# Patient Record
Sex: FEMALE | Race: WHITE | NOT HISPANIC OR LATINO | Employment: OTHER | URBAN - METROPOLITAN AREA
[De-identification: names, ages, dates, MRNs, and addresses within clinical notes are randomized per-mention and may not be internally consistent; named-entity substitution may affect disease eponyms.]

---

## 2017-10-05 ENCOUNTER — TRANSFERRED RECORDS (OUTPATIENT)
Dept: HEALTH INFORMATION MANAGEMENT | Facility: CLINIC | Age: 62
End: 2017-10-05

## 2017-10-24 ENCOUNTER — OFFICE VISIT (OUTPATIENT)
Dept: FAMILY MEDICINE | Facility: CLINIC | Age: 62
End: 2017-10-24
Payer: COMMERCIAL

## 2017-10-24 VITALS
TEMPERATURE: 97.9 F | WEIGHT: 148.4 LBS | HEART RATE: 52 BPM | DIASTOLIC BLOOD PRESSURE: 68 MMHG | HEIGHT: 66 IN | SYSTOLIC BLOOD PRESSURE: 114 MMHG | OXYGEN SATURATION: 96 % | BODY MASS INDEX: 23.85 KG/M2

## 2017-10-24 DIAGNOSIS — M94.9 DISORDER OF BONE AND CARTILAGE: ICD-10-CM

## 2017-10-24 DIAGNOSIS — Z00.00 ENCOUNTER FOR WELLNESS EXAMINATION IN ADULT: Primary | ICD-10-CM

## 2017-10-24 DIAGNOSIS — E78.5 HYPERLIPIDEMIA LDL GOAL <160: ICD-10-CM

## 2017-10-24 DIAGNOSIS — Z12.39 BREAST CANCER SCREENING: ICD-10-CM

## 2017-10-24 DIAGNOSIS — Z11.59 NEED FOR HEPATITIS C SCREENING TEST: ICD-10-CM

## 2017-10-24 DIAGNOSIS — Z23 NEED FOR VACCINATION: ICD-10-CM

## 2017-10-24 DIAGNOSIS — Z13.1 SCREENING FOR DIABETES MELLITUS: ICD-10-CM

## 2017-10-24 DIAGNOSIS — Z13.21 ENCOUNTER FOR VITAMIN DEFICIENCY SCREENING: ICD-10-CM

## 2017-10-24 DIAGNOSIS — M89.9 DISORDER OF BONE AND CARTILAGE: ICD-10-CM

## 2017-10-24 LAB
CHOLEST SERPL-MCNC: 237 MG/DL
DEPRECATED CALCIDIOL+CALCIFEROL SERPL-MC: 51 UG/L (ref 20–75)
GLUCOSE SERPL-MCNC: 92 MG/DL (ref 70–99)
HCV AB SERPL QL IA: NONREACTIVE
HDLC SERPL-MCNC: 88 MG/DL
LDLC SERPL CALC-MCNC: 131 MG/DL
NONHDLC SERPL-MCNC: 149 MG/DL
TRIGL SERPL-MCNC: 89 MG/DL

## 2017-10-24 PROCEDURE — 82306 VITAMIN D 25 HYDROXY: CPT | Performed by: FAMILY MEDICINE

## 2017-10-24 PROCEDURE — 99396 PREV VISIT EST AGE 40-64: CPT | Mod: 25 | Performed by: FAMILY MEDICINE

## 2017-10-24 PROCEDURE — 82947 ASSAY GLUCOSE BLOOD QUANT: CPT | Performed by: FAMILY MEDICINE

## 2017-10-24 PROCEDURE — 36415 COLL VENOUS BLD VENIPUNCTURE: CPT | Performed by: FAMILY MEDICINE

## 2017-10-24 PROCEDURE — 90686 IIV4 VACC NO PRSV 0.5 ML IM: CPT | Performed by: FAMILY MEDICINE

## 2017-10-24 PROCEDURE — 80061 LIPID PANEL: CPT | Performed by: FAMILY MEDICINE

## 2017-10-24 PROCEDURE — 86803 HEPATITIS C AB TEST: CPT | Performed by: FAMILY MEDICINE

## 2017-10-24 PROCEDURE — 90471 IMMUNIZATION ADMIN: CPT | Performed by: FAMILY MEDICINE

## 2017-10-24 NOTE — PROGRESS NOTES
SUBJECTIVE:   CC: Marbella Welsh is an 62 year old woman who presents for preventive health visit.   According to patient  She is active, jog on track , and lift weights at the gym, ride sationary bike- at gym, also does scott & yoga and walks the dog  She is menopausal for years, first child at age 35- used estrogen for 1 yr- none since many years- no hot flashes  Last DEXA- many years ago- it was borderline  She does take vitamin D 1000 mg 5 times a week    Healthy Habits:    Do you get at least three servings of calcium containing foods daily (dairy, green leafy vegetables, etc.)? yes    Amount of exercise or daily activities, outside of work: 5 day(s) per week    Problems taking medications regularly not applicable    Medication side effects: No    Have you had an eye exam in the past two years? yes    Do you see a dentist twice per year? yes    Do you have sleep apnea, excessive snoring or daytime drowsiness?no          PROBLEMS TO ADD ON...    Today's PHQ-2 Score:   PHQ-2 ( 1999 Pfizer) 10/24/2017 10/7/2013   Q1: Little interest or pleasure in doing things 0 0   Q2: Feeling down, depressed or hopeless 0 0   PHQ-2 Score 0 0         Abuse: Current or Past(Physical, Sexual or Emotional)- No  Do you feel safe in your environment - Yes  Social History   Substance Use Topics     Smoking status: Former Smoker     Types: Cigarettes     Quit date: 1/23/1978     Smokeless tobacco: Never Used     Alcohol use Yes      Comment: 2 beers weekly     The patient does not drink >3 drinks per day nor >7 drinks per week.    Reviewed orders with patient.  Reviewed health maintenance and updated orders accordingly - Yes  Labs reviewed in [facility]    Patient over age 50, mutual decision to screen reflected in health maintenance.      Pertinent mammograms are reviewed under the imaging tab.  History of abnormal Pap smear: Status post benign hysterectomy. Health Maintenance and Surgical History updated.    Reviewed and updated as needed  "this visit by clinical staff  Tobacco  Allergies  Meds         Reviewed and updated as needed this visit by Provider              ROS:  C: NEGATIVE for fever, chills, change in weight  I: NEGATIVE for worrisome rashes, moles or lesions  E: NEGATIVE for vision changes or irritation  ENT: NEGATIVE for ear, mouth and throat problems  R: NEGATIVE for significant cough or SOB  B: NEGATIVE for masses, tenderness or discharge  CV: NEGATIVE for chest pain, palpitations or peripheral edema  GI: NEGATIVE for nausea, abdominal pain, heartburn, or change in bowel habits  : NEGATIVE for unusual urinary or vaginal symptoms. Periods are regular.  M: NEGATIVE for significant arthralgias or myalgia  N: NEGATIVE for weakness, dizziness or paresthesias  P: NEGATIVE for changes in mood or affect    OBJECTIVE:   /68  Pulse 52  Temp 97.9  F (36.6  C) (Oral)  Ht 5' 6.25\" (1.683 m)  Wt 148 lb 6.4 oz (67.3 kg)  SpO2 96%  BMI 23.77 kg/m2  EXAM:  GENERAL: healthy, alert and no distress  EYES: Eyes grossly normal to inspection, PERRL and conjunctivae and sclerae normal  HENT: ear canals and TM's normal, nose and mouth without ulcers or lesions  NECK: no adenopathy, no asymmetry, masses, or scars and thyroid normal to palpation  RESP: lungs clear to auscultation - no rales, rhonchi or wheezes  BREAST: normal without masses, tenderness or nipple discharge and no palpable axillary masses or adenopathy  CV: regular rate and rhythm, normal S1 S2, no S3 or S4, no murmur, click or rub, no peripheral edema and peripheral pulses strong  ABDOMEN: soft, nontender, no hepatosplenomegaly, no masses and bowel sounds normal  MS: no gross musculoskeletal defects noted, no edema  SKIN: no suspicious lesions or rashes  NEURO: Normal strength and tone, mentation intact and speech normal  PSYCH: mentation appears normal, affect normal/bright    ASSESSMENT/PLAN:   (Z00.00) Encounter for wellness examination in adult  (primary encounter " "diagnosis)  Plan: Please see patient instructions     (E78.5) Hyperlipidemia LDL goal <160  Comment: fasting -   Plan: Lipid panel reflex to direct LDL Fasting            (M89.9,  M94.9) Disorder of bone and cartilage  Comment: Plan: DX Hip/Pelvis/Spine  Continue with wt bearing excercise  Discussed bisphosphonate to be started weekly if osteopenia/ OP- and high risk of fractures  Will send her the letter  Potential medication side effects were discussed with the patient; let me know if any occur.            (Z12.31) Breast cancer screening  Comment: Plan: completed Oct 2017- continue annually     (Z13.1) Screening for diabetes mellitus  Comment: fasting  Plan: GLUCOSE            (Z11.59) Need for hepatitis C screening test  Comment: Plan: Hepatitis C Screen Reflex to HCV RNA Quant and         Genotype            (Z23) Need for vaccination  Comment: Plan: HC FLU VAC PRESRV FREE QUAD SPLIT VIR 3+YRS IM            (Z13.21) Encounter for vitamin deficiency screening  Comment: 5 days a week- supplemental Vitamin D 1000 mg  Plan: Vitamin D Deficiency              COUNSELING:   Reviewed preventive health counseling, as reflected in patient instructions       Regular exercise       Healthy diet/nutrition         reports that she quit smoking about 39 years ago. Her smoking use included Cigarettes. She has never used smokeless tobacco.    Estimated body mass index is 23.77 kg/(m^2) as calculated from the following:    Height as of this encounter: 5' 6.25\" (1.683 m).    Weight as of this encounter: 148 lb 6.4 oz (67.3 kg).         Counseling Resources:  ATP IV Guidelines  Pooled Cohorts Equation Calculator  Breast Cancer Risk Calculator  FRAX Risk Assessment  ICSI Preventive Guidelines  Dietary Guidelines for Americans, 2010  USDA's MyPlate  ASA Prophylaxis  Lung CA Screening    Nancy Marie MD  [facility]  "

## 2017-10-24 NOTE — LETTER
October 25, 2017    Marbella Welsh  [address]  [facility] [address]      Dear Marlene Tyson seeing you in the clinic today     - total Cholesterol sightly high- rest of the  levels (LDL,HDL, Triglycerides) are normal.  ADVISE: rechecking in 1 year.   -Glucose (diabetic screening test) is normal.   -Hepatitis C antibody screen test shows no signs of a previous hepatitis C infection.   -Vitamin D level is normal, 1000 IU daily in diet or supplements is recommended.     Please keep us posted with questions or concerns .     Thank you very much for choosing [facility]. Please call my office if you have any questions or concerns.      Sincerely,        Nancy Marie MD  Resulted Orders   GLUCOSE   Result Value Ref Range    Glucose 92 70 - 99 mg/dL   Lipid panel reflex to direct LDL Fasting   Result Value Ref Range    Cholesterol 237 (H) <200 mg/dL      Comment:      Desirable:       <200 mg/dl    Triglycerides 89 <150 mg/dL    HDL Cholesterol 88 >49 mg/dL    LDL Cholesterol Calculated 131 (H) <100 mg/dL      Comment:      Above desirable:  100-129 mg/dl  Borderline High:  130-159 mg/dL  High:             160-189 mg/dL  Very high:       >189 mg/dl      Non HDL Cholesterol 149 (H) <130 mg/dL      Comment:      Above Desirable:  130-159 mg/dl  Borderline high:  160-189 mg/dl  High:             190-219 mg/dl  Very high:       >219 mg/dl     Hepatitis C Screen Reflex to HCV RNA Quant and Genotype   Result Value Ref Range    Hepatitis C Antibody Nonreactive NR^Nonreactive      Comment:      Assay performance characteristics have not been established for newborns,   infants, and children     Vitamin D Deficiency   Result Value Ref Range    Vitamin D Deficiency screening 51 20 - 75 ug/L      Comment:      Season, race, dietary intake, and treatment affect the concentration of   25-hydroxy-Vitamin D. Values may decrease during winter months and increase   during summer months. Values 20-29 ug/L  may indicate Vitamin D insufficiency   and values <20 ug/L may indicate Vitamin D deficiency.  Vitamin D determination is routinely performed by an immunoassay specific for   25 hydroxyvitamin D3.  If an individual is on vitamin D2 (ergocalciferol)   supplementation, please specify 25 OH vitamin D2 and D3 level determination by   LCMSMS test [id number].

## 2017-10-24 NOTE — PATIENT INSTRUCTIONS
please call [phone] to make appointment   Preventive Health Recommendations  Female Ages 50 - 64    Yearly exam: See your health care provider every year in order to  o Review health changes.   o Discuss preventive care.    o Review your medicines if your doctor has prescribed any.      Get a Pap test every three years (unless you have an abnormal result and your provider advises testing more often).    If you get Pap tests with HPV test, you only need to test every 5 years, unless you have an abnormal result.     You do not need a Pap test if your uterus was removed (hysterectomy) and you have not had cancer.    You should be tested each year for STDs (sexually transmitted diseases) if you're at risk.     Have a mammogram every 1 to 2 years.    Have a colonoscopy at age 50, or have a yearly FIT test (stool test). These exams screen for colon cancer.      Have a cholesterol test every 5 years, or more often if advised.    Have a diabetes test (fasting glucose) every three years. If you are at risk for diabetes, you should have this test more often.     If you are at risk for osteoporosis (brittle bone disease), think about having a bone density scan (DEXA).    Shots: Get a flu shot each year. Get a tetanus shot every 10 years.    Nutrition:     Eat at least 5 servings of fruits and vegetables each day.    Eat whole-grain bread, whole-wheat pasta and brown rice instead of white grains and rice.    Talk to your provider about Calcium and Vitamin D.     Lifestyle    Exercise at least 150 minutes a week (30 minutes a day, 5 days a week). This will help you control your weight and prevent disease.    Limit alcohol to one drink per day.    No smoking.     Wear sunscreen to prevent skin cancer.     See your dentist every six months for an exam and cleaning.    See your eye doctor every 1 to 2 years.

## 2017-10-24 NOTE — NURSING NOTE
"Chief Complaint   Patient presents with     Physical       Initial /68  Pulse 52  Temp 97.9  F (36.6  C) (Oral)  Ht 5' 6.25\" (1.683 m)  Wt 148 lb 6.4 oz (67.3 kg)  SpO2 96%  BMI 23.77 kg/m2 Estimated body mass index is 23.77 kg/(m^2) as calculated from the following:    Height as of this encounter: 5' 6.25\" (1.683 m).    Weight as of this encounter: 148 lb 6.4 oz (67.3 kg).  Medication Reconciliation: complete      Health Maintenance that is potentially due pending provider review:  NONE    n/a    ROSAURA Espinosa  "

## 2017-10-24 NOTE — MR AVS SNAPSHOT
After Visit Summary   10/24/2017    Marbella Welsh    MRN: [id number]           Patient Information     Date Of Birth          1955        Visit Information        Provider Department      10/24/2017 8:30 AM Nancy Marie MD [facility]        Today's Diagnoses     Encounter for wellness examination in adult    -  1    Hyperlipidemia LDL goal <160        Disorder of bone and cartilage        Breast cancer screening        Screening for diabetes mellitus        Need for hepatitis C screening test        Need for vaccination        Encounter for vitamin deficiency screening          Care Instructions    please call [phone] to make appointment   Preventive Health Recommendations  Female Ages 50 - 64    Yearly exam: See your health care provider every year in order to  o Review health changes.   o Discuss preventive care.    o Review your medicines if your doctor has prescribed any.      Get a Pap test every three years (unless you have an abnormal result and your provider advises testing more often).    If you get Pap tests with HPV test, you only need to test every 5 years, unless you have an abnormal result.     You do not need a Pap test if your uterus was removed (hysterectomy) and you have not had cancer.    You should be tested each year for STDs (sexually transmitted diseases) if you're at risk.     Have a mammogram every 1 to 2 years.    Have a colonoscopy at age 50, or have a yearly FIT test (stool test). These exams screen for colon cancer.      Have a cholesterol test every 5 years, or more often if advised.    Have a diabetes test (fasting glucose) every three years. If you are at risk for diabetes, you should have this test more often.     If you are at risk for osteoporosis (brittle bone disease), think about having a bone density scan (DEXA).    Shots: Get a flu shot each year. Get a tetanus shot every 10 years.    Nutrition:     Eat at least 5 servings of fruits and  "vegetables each day.    Eat whole-grain bread, whole-wheat pasta and brown rice instead of white grains and rice.    Talk to your provider about Calcium and Vitamin D.     Lifestyle    Exercise at least 150 minutes a week (30 minutes a day, 5 days a week). This will help you control your weight and prevent disease.    Limit alcohol to one drink per day.    No smoking.     Wear sunscreen to prevent skin cancer.     See your dentist every six months for an exam and cleaning.    See your eye doctor every 1 to 2 years.            Follow-ups after your visit        Future tests that were ordered for you today     Open Future Orders        Priority Expected Expires Ordered    DX Hip/Pelvis/Spine Routine  10/24/2018 10/24/2017            Who to contact     If you have questions or need follow up information about today's clinic visit or your schedule please contact [facility] directly at [phone].  Normal or non-critical lab and imaging results will be communicated to you by MyChart, letter or phone within 4 business days after the clinic has received the results. If you do not hear from us within 7 days, please contact the clinic through [employer] or phone. If you have a critical or abnormal lab result, we will notify you by phone as soon as possible.  Submit refill requests through [employer]oBox or call your pharmacy and they will forward the refill request to us. Please allow 3 business days for your refill to be completed.          Additional Information About Your Visit        [employer] Information     PrestoBox lets you send messages to your doctor, view your test results, renew your prescriptions, schedule appointments and more. To sign up, go to [email or web address] . Click on \"Log in\" on the left side of the screen, which will take you to the Welcome page. Then click on \"Sign up Now\" on the right side of the page.     You will be asked to enter the access code listed below, as well as some personal " "information. Please follow the directions to create your username and password.     Your access code is: [email or web address]  Expires: 2018  9:03 AM     Your access code will  in 90 days. If you need help or a new code, please call your [place] clinic or [phone].        Care EveryWhere ID     This is your Care EveryWhere ID. This could be used by other organizations to access your [place] medical records  [id number]        Your Vitals Were     Pulse Temperature Height Pulse Oximetry BMI (Body Mass Index)       52 97.9  F (36.6  C) (Oral) 5' 6.25\" (1.683 m) 96% 23.77 kg/m2        Blood Pressure from Last 3 Encounters:   10/24/17 114/68   08/04/15 114/65   10/07/13 112/60    Weight from Last 3 Encounters:   10/24/17 148 lb 6.4 oz (67.3 kg)   08/04/15 147 lb 3.2 oz (66.8 kg)   10/07/13 148 lb (67.1 kg)              We Performed the Following     GLUCOSE     HC FLU VAC PRESRV FREE QUAD SPLIT VIR 3+YRS IM     Hepatitis C Screen Reflex to HCV RNA Quant and Genotype     Lipid panel reflex to direct LDL Fasting     Vitamin D Deficiency        Primary Care Provider Office Phone # Fax #    Nancy Marie [phone] [address] [facility] [address]        Equal Access to Services     RON DOAN AH: Hadii tien gregg hadasho Sosaraali, waaxda luqadaha, qaybta kaalmada adeegyada, josesito spence. So [facility] [phone].    ATENCIÓN: Si habla español, tiene a kline disposición servicios gratuitos de asistencia lingüística. Llame al [phone].    We comply with applicable federal civil rights laws and [place] laws. We do not discriminate on the basis of race, color, national origin, age, disability, sex, sexual orientation, or gender identity.            Thank you!     Thank you for choosing [facility]  for your care. Our goal is always to provide you with excellent care. Hearing back from our patients is one way we can continue to improve our " services. Please take a few minutes to complete the written survey that you may receive in the mail after your visit with us. Thank you!             Your Updated Medication List - Protect others around you: Learn how to safely use, store and throw away your medicines at www.disposemymeds.org.          This list is accurate as of: 10/24/17  9:03 AM.  Always use your most recent med list.                   Brand Name Dispense Instructions for use Diagnosis    fish oil-omega-3 fatty acids 1000 MG capsule      1 TABLET DAILY        MULTIVITAL Tabs      1 TABLET DAILY

## 2017-10-24 NOTE — PROGRESS NOTES
Send lab & letter      Pleasure seeing you in the clinic today    - total Cholesterol sightly high- rest of the  levels (LDL,HDL, Triglycerides) are normal.  ADVISE: rechecking in 1 year.  -Glucose (diabetic screening test) is normal.  -Hepatitis C antibody screen test shows no signs of a previous hepatitis C infection.  -Vitamin D level is normal, 1000 IU daily in diet or supplements is recommended.     Please keep us posted with questions or concerns .      Best Regards,    Nancy Marie MD  [facility]  [phone]

## 2018-05-10 ENCOUNTER — TELEPHONE (OUTPATIENT)
Dept: FAMILY MEDICINE | Facility: CLINIC | Age: 63
End: 2018-05-10

## 2018-05-10 NOTE — TELEPHONE ENCOUNTER
Reason for Call: Request for an order or referral:    Order or referral being requested: Strep    Date needed: as soon as possible    Has the patient been seen by the PCP for this problem? 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  YES    Call taken on 5/10/2018 at 5:01 PM by Liv Scott

## 2018-05-11 ENCOUNTER — OFFICE VISIT (OUTPATIENT)
Dept: FAMILY MEDICINE | Facility: CLINIC | Age: 63
End: 2018-05-11
Payer: COMMERCIAL

## 2018-05-11 VITALS
BODY MASS INDEX: 24.01 KG/M2 | DIASTOLIC BLOOD PRESSURE: 63 MMHG | OXYGEN SATURATION: 96 % | HEIGHT: 66 IN | HEART RATE: 59 BPM | WEIGHT: 149.4 LBS | TEMPERATURE: 98 F | SYSTOLIC BLOOD PRESSURE: 116 MMHG

## 2018-05-11 DIAGNOSIS — J02.0 STREP THROAT: Primary | ICD-10-CM

## 2018-05-11 DIAGNOSIS — R07.0 THROAT PAIN: ICD-10-CM

## 2018-05-11 LAB
DEPRECATED S PYO AG THROAT QL EIA: ABNORMAL
SPECIMEN SOURCE: ABNORMAL

## 2018-05-11 PROCEDURE — 87880 STREP A ASSAY W/OPTIC: CPT | Performed by: PHYSICIAN ASSISTANT

## 2018-05-11 PROCEDURE — 99213 OFFICE O/P EST LOW 20 MIN: CPT | Performed by: PHYSICIAN ASSISTANT

## 2018-05-11 RX ORDER — AMOXICILLIN 875 MG
875 TABLET ORAL 2 TIMES DAILY
Qty: 20 TABLET | Refills: 0 | Status: SHIPPED | OUTPATIENT
Start: 2018-05-11 | End: 2018-06-19

## 2018-05-11 NOTE — MR AVS SNAPSHOT
"              After Visit Summary   5/11/2018    Marbella Welsh    MRN: [id number]           Patient Information     Date Of Birth          1955        Visit Information        Provider Department      5/11/2018 3:00 PM Parker Jimenez PA-C [facility]        Today's Diagnoses     Strep throat    -  1    Throat pain           Follow-ups after your visit        Follow-up notes from your care team     Return if symptoms worsen or fail to improve.      Who to contact     If you have questions or need follow up information about today's clinic visit or your schedule please contact [facility] directly at [phone].  Normal or non-critical lab and imaging results will be communicated to you by MyChart, letter or phone within 4 business days after the clinic has received the results. If you do not hear from us within 7 days, please contact the clinic through [employer] or phone. If you have a critical or abnormal lab result, we will notify you by phone as soon as possible.  Submit refill requests through bitHound or call your pharmacy and they will forward the refill request to us. Please allow 3 business days for your refill to be completed.          Additional Information About Your Visit        MyChart Information     bitHound gives you secure access to your electronic health record. If you see a primary care provider, you can also send messages to your care team and make appointments. If you have questions, please call your primary care clinic.  If you do not have a primary care provider, please call [phone] and they will assist you.        Care EveryWhere ID     This is your Care EveryWhere ID. This could be used by other organizations to access your [place] medical records  [id number]        Your Vitals Were     Pulse Temperature Height Pulse Oximetry BMI (Body Mass Index)       59 98  F (36.7  C) (Tympanic) 5' 6.25\" (1.683 m) 96% 23.93 kg/m2        Blood Pressure from Last 3 " Encounters:   05/11/18 116/63   10/24/17 114/68   08/04/15 114/65    Weight from Last 3 Encounters:   05/11/18 149 lb 6.4 oz (67.8 kg)   10/24/17 148 lb 6.4 oz (67.3 kg)   08/04/15 147 lb 3.2 oz (66.8 kg)              We Performed the Following     Rapid strep screen          Today's Medication Changes          These changes are accurate as of 5/11/18  3:39 PM.  If you have any questions, ask your nurse or doctor.               Start taking these medicines.        Dose/Directions    amoxicillin 875 MG tablet   Commonly known as:  AMOXIL   Used for:  Strep throat        Dose:  875 mg   Take 1 tablet (875 mg) by mouth 2 times daily   Quantity:  20 tablet   Refills:  0            Where to get your medicines      These medications were sent to [place] [address] [place] [address] [address]     Phone:  [phone]     amoxicillin 875 MG tablet                Primary Care Provider Office Phone # Fax #    Nancy Geri Marie [phone] [address] [facility] [address]        Equal Access to Services     IRVING DOAN AH: Kathleen bouchero Sotito, waaxda luqadaha, qaybta kaalmada adecindada, josesito spence. So [facility] [phone].    ATENCIÓN: Si habla español, tiene a kline disposición servicios gratuitos de asistencia lingüística. Melanie al [phone].    We comply with applicable federal civil rights laws and [place] laws. We do not discriminate on the basis of race, color, national origin, age, disability, sex, sexual orientation, or gender identity.            Thank you!     Thank you for choosing [facility]  for your care. Our goal is always to provide you with excellent care. Hearing back from our patients is one way we can continue to improve our services. Please take a few minutes to complete the written survey that you may receive in the mail after your visit with us. Thank you!              Your Updated Medication List - Protect others around you: Learn how to safely use, store and throw away your medicines at www.disposemymeds.org.          This list is accurate as of 5/11/18  3:39 PM.  Always use your most recent med list.                   Brand Name Dispense Instructions for use Diagnosis    amoxicillin 875 MG tablet    AMOXIL    20 tablet    Take 1 tablet (875 mg) by mouth 2 times daily    Strep throat       fish oil-omega-3 fatty acids 1000 MG capsule      1 TABLET DAILY        MULTIVITAL Tabs      1 TABLET DAILY

## 2018-05-11 NOTE — PROGRESS NOTES
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  She does however have a stomachache, headache, and just all over not feeling well.  She would like to have a strep culture done today to rule that out.             Problem list and histories reviewed & adjusted, as indicated.  Additional history: as documented    BP Readings from Last 3 Encounters:   05/11/18 116/63   10/24/17 114/68   08/04/15 114/65    Wt Readings from Last 3 Encounters:   05/11/18 149 lb 6.4 oz (67.8 kg)   10/24/17 148 lb 6.4 oz (67.3 kg)   08/04/15 147 lb 3.2 oz (66.8 kg)                    Reviewed and updated as needed this visit by clinical staff  Meds       Reviewed and updated as needed this visit by Provider         ROS:  Constitutional, HEENT, cardiovascular, pulmonary, gi and gu systems are negative, except as otherwise noted.    OBJECTIVE:     /63  Pulse 59  Temp 98  F (36.7  C) (Tympanic)  Ht 5' 6.25\" (1.683 m)  Wt 149 lb 6.4 oz (67.8 kg)  SpO2 96%  BMI 23.93 kg/m2  Body mass index is 23.93 kg/(m^2).  GENERAL: alert and no distress  EYES: Eyes grossly normal to inspection  HENT: normal cephalic/atraumatic, ear canals and TM's normal, nose and mouth without ulcers or lesions, oropharynx clear and oral mucous membranes moist  NECK: no adenopathy, no asymmetry, masses, or scars and thyroid normal to palpation  RESP: lungs clear to auscultation - no rales, rhonchi or wheezes  CV: regular rate and rhythm, normal S1 S2, no S3 or S4, no murmur, click or rub, no peripheral edema and peripheral pulses strong  SKIN: no suspicious lesions or rashes    Diagnostic Test Results:  Results for orders placed or performed in visit on 05/11/18 (from the past 24 hour(s)) "   Rapid strep screen   Result Value Ref Range    Specimen Description Throat     Rapid Strep A Screen (A)      POSITIVE: Group A Streptococcal antigen detected by immunoassay.       ASSESSMENT/PLAN:             1. Strep throat  OTC probiotics while on antibiotic to help limit some potential side effects.   - amoxicillin (AMOXIL) 875 MG tablet; Take 1 tablet (875 mg) by mouth 2 times daily  Dispense: 20 tablet; Refill: 0    2. Throat pain    - Rapid strep screen    Follow up if symptoms persist or worsen     Parker Jimenez PA-C  [facility] [place] [facility]

## 2018-06-12 ENCOUNTER — HOSPITAL ENCOUNTER (OUTPATIENT)
Dept: BONE DENSITY | Facility: CLINIC | Age: 63
Discharge: HOME | End: 2018-06-12
Attending: FAMILY MEDICINE | Admitting: FAMILY MEDICINE
Payer: COMMERCIAL

## 2018-06-12 DIAGNOSIS — M89.9 DISORDER OF BONE AND CARTILAGE: ICD-10-CM

## 2018-06-12 DIAGNOSIS — M94.9 DISORDER OF BONE AND CARTILAGE: ICD-10-CM

## 2018-06-12 PROCEDURE — 77080 DXA BONE DENSITY AXIAL: CPT

## 2018-06-13 ENCOUNTER — MYC MEDICAL ADVICE (OUTPATIENT)
Dept: FAMILY MEDICINE | Facility: CLINIC | Age: 63
End: 2018-06-13

## 2018-06-13 NOTE — PROGRESS NOTES
Hi    You do have osteoporosis & starting medications to prevent further bone loss is a good option- besides weight bearing excercise .    I would like to discuss the medications in detail to go over the side effets    Please let me know- if you willing to start medications & then we can arrange for telephone only visit    thanks

## 2018-06-19 ENCOUNTER — OFFICE VISIT (OUTPATIENT)
Dept: FAMILY MEDICINE | Facility: CLINIC | Age: 63
End: 2018-06-19
Payer: COMMERCIAL

## 2018-06-19 VITALS
WEIGHT: 148 LBS | SYSTOLIC BLOOD PRESSURE: 117 MMHG | BODY MASS INDEX: 23.78 KG/M2 | TEMPERATURE: 98.1 F | DIASTOLIC BLOOD PRESSURE: 58 MMHG | RESPIRATION RATE: 16 BRPM | HEART RATE: 58 BPM | HEIGHT: 66 IN | OXYGEN SATURATION: 98 %

## 2018-06-19 DIAGNOSIS — M81.0 AGE-RELATED OSTEOPOROSIS WITHOUT CURRENT PATHOLOGICAL FRACTURE: Primary | ICD-10-CM

## 2018-06-19 DIAGNOSIS — Z23 NEED FOR VACCINATION: ICD-10-CM

## 2018-06-19 PROCEDURE — 99213 OFFICE O/P EST LOW 20 MIN: CPT | Performed by: FAMILY MEDICINE

## 2018-06-19 RX ORDER — ALENDRONATE SODIUM 70 MG/1
TABLET ORAL
Qty: 4 TABLET | Refills: 11 | Status: SHIPPED | OUTPATIENT
Start: 2018-06-19 | End: 2019-06-04

## 2018-06-19 NOTE — MR AVS SNAPSHOT
After Visit Summary   6/19/2018    Marbella Welsh    MRN: [id number]           Patient Information     Date Of Birth          1955        Visit Information        Provider Department      6/19/2018 7:40 AM Nancy Marie MD [facility]        Today's Diagnoses     Age-related osteoporosis without current pathological fracture    -  1    Need for vaccination          Care Instructions    Osteoporosis Risk Score/FRAX score  -2.5  [email or web address]  Risk of Hip Fracture: 2 % (Significant if >3%)  Risk of Overall Fracture: 17 (Significant if >20%)    Take alendronate  * 8 oz of water,  * wait 30-60 mins before any food or other medications,   * Remain 30-60 mins in upright position.  * No calcium or iron tablet for 4 hours    Side effects may include following and please call or return office visit to discuss them further.  Gastroenterology disturbances like  abdominal pain, heart burn, and diarrhea.  Severe bone , joint pain and or muscle pain.          And repeat Bone density every 2 years.                  Follow-ups after your visit        Future tests that were ordered for you today     Open Future Orders        Priority Expected Expires Ordered    DX Hip/Pelvis/Spine Routine  6/19/2019 6/19/2018            Who to contact     If you have questions or need follow up information about today's clinic visit or your schedule please contact [facility] directly at [phone].  Normal or non-critical lab and imaging results will be communicated to you by MyChart, letter or phone within 4 business days after the clinic has received the results. If you do not hear from us within 7 days, please contact the clinic through MyChart or phone. If you have a critical or abnormal lab result, we will notify you by phone as soon as possible.  Submit refill requests through [employer] or call your pharmacy and they will forward the refill request to us. Please allow 3 business days  "for your refill to be completed.          Additional Information About Your Visit        [employer] Information     [facility] gives you secure access to your electronic health record. If you see a primary care provider, you can also send messages to your care team and make appointments. If you have questions, please call your primary care clinic.  If you do not have a primary care provider, please call [phone] and they will assist you.        Care EveryWhere ID     This is your Care EveryWhere ID. This could be used by other organizations to access your [place] medical records  [id number]        Your Vitals Were     Pulse Temperature Respirations Height Pulse Oximetry BMI (Body Mass Index)    58 98.1  F (36.7  C) (Oral) 16 5' 6.25\" (1.683 m) 98% 23.71 kg/m2       Blood Pressure from Last 3 Encounters:   06/19/18 117/58   05/11/18 116/63   10/24/17 114/68    Weight from Last 3 Encounters:   06/19/18 148 lb (67.1 kg)   05/11/18 149 lb 6.4 oz (67.8 kg)   10/24/17 148 lb 6.4 oz (67.3 kg)                 Today's Medication Changes          These changes are accurate as of 6/19/18  8:30 AM.  If you have any questions, ask your nurse or doctor.               Start taking these medicines.        Dose/Directions    alendronate 70 MG tablet   Commonly known as:  FOSAMAX   Used for:  Age-related osteoporosis without current pathological fracture   Started by:  Nancy Marie MD        Take 1 tablet (70 mg) by mouth with 8oz water every 7 days 30 minutes before breakfast and remain upright during this time.   Quantity:  4 tablet   Refills:  11         Stop taking these medicines if you haven't already. Please contact your care team if you have questions.     amoxicillin 875 MG tablet   Commonly known as:  AMOXIL   Stopped by:  Nancy Marie MD           fish oil-omega-3 fatty acids 1000 MG capsule   Stopped by:  Nancy Marie MD                Where to get your medicines      These medications were sent " to [facility] [id number] [facility] - [place], [place] - [address]  [address] [address], [facility] [address]     Phone:  [phone]     alendronate 70 MG tablet                Primary Care Provider Office Phone # Fax #    Nancy Geri Marie [phone]       [address] [facility] [address]        Equal Access to Services     [facility] : Hadii aad ku hadasho Soomaali, waaxda luqadaha, qaybta kaalmada adeegyada, waxay idiin hayaan adeeg bartisra latheodore . So [facility] [phone].    ATENCIÓN: Si habla español, tiene a kline disposición servicios gratuitos de asistencia lingüística. Llame al [phone].    We comply with applicable federal civil rights laws and [place] laws. We do not discriminate on the basis of race, color, national origin, age, disability, sex, sexual orientation, or gender identity.            Thank you!     Thank you for choosing [facility]  for your care. Our goal is always to provide you with excellent care. Hearing back from our patients is one way we can continue to improve our services. Please take a few minutes to complete the written survey that you may receive in the mail after your visit with us. Thank you!             Your Updated Medication List - Protect others around you: Learn how to safely use, store and throw away your medicines at www.disposemymeds.org.          This list is accurate as of 6/19/18  8:30 AM.  Always use your most recent med list.                   Brand Name Dispense Instructions for use Diagnosis    alendronate 70 MG tablet    FOSAMAX    4 tablet    Take 1 tablet (70 mg) by mouth with 8oz water every 7 days 30 minutes before breakfast and remain upright during this time.    Age-related osteoporosis without current pathological fracture       MAGNESIUM OXIDE PO           MULTIVITAL Tabs      1 TABLET DAILY        VITAMIN D (CHOLECALCIFEROL) PO      Take 1,000 Units by mouth daily

## 2018-06-19 NOTE — PATIENT INSTRUCTIONS
Osteoporosis Risk Score/FRAX score  -2.5  [email or web address]://[email or web address]  Risk of Hip Fracture: 2 % (Significant if >3%)  Risk of Overall Fracture: 17 (Significant if >20%)    Take alendronate  * 8 oz of water,  * wait 30-60 mins before any food or other medications,   * Remain 30-60 mins in upright position.  * No calcium or iron tablet for 4 hours    Side effects may include following and please call or return office visit to discuss them further.  Gastroenterology disturbances like  abdominal pain, heart burn, and diarrhea.  Severe bone , joint pain and or muscle pain.          And repeat Bone density every 2 years.

## 2018-06-19 NOTE — NURSING NOTE
"Chief Complaint   Patient presents with     Results     Dexa results      /58  Pulse 58  Temp 98.1  F (36.7  C) (Oral)  Resp 16  Ht 5' 6.25\" (1.683 m)  Wt 148 lb (67.1 kg)  SpO2 98%  BMI 23.71 kg/m2 Estimated body mass index is 23.71 kg/(m^2) as calculated from the following:    Height as of this encounter: 5' 6.25\" (1.683 m).    Weight as of this encounter: 148 lb (67.1 kg).  bp completed using cuff size: regular       Health Maintenance addressed:  NONE    n/a    Marianela Wren MA     "

## 2018-06-19 NOTE — PROGRESS NOTES
"  SUBJECTIVE:   Marbella Welsh is a 63 year old female who presents to clinic today for the following health issues:      Patient is here to discuss dexa results.   She does use GYM at local facility for some weight bearing excercise  She is interested in yoga  Relatively active life style    Does take over the counter vitamin D 1000 international unit with calcium in her multivitamin  She is non smoker. Alcohol consumption is occasional if at all   PROBLEMS TO ADD ON...    Problem list and histories reviewed & adjusted, as indicated.  Additional history: as documented    Patient Active Problem List   Diagnosis     Disorder of bone and cartilage     HYPERLIPIDEMIA LDL GOAL <160     Advanced directives, counseling/discussion     Tinnitus     Breast cancer screening     Past Surgical History:   Procedure Laterality Date     [place] wrist       C RAD RESEC TONSIL/PILLARS       C TOTAL ABDOM HYSTERECTOMY  2000     TOE SURGERY         Social History   Substance Use Topics     Smoking status: Former Smoker     Types: Cigarettes     Quit date: 1/23/1978     Smokeless tobacco: Never Used     Alcohol use Yes      Comment: 2 beers weekly     Family History   Problem Relation Age of Onset     Family History Negative No family hx of      only child and mom healthy           Reviewed and updated as needed this visit by clinical staff  Tobacco  Allergies  Meds  Med Hx  Surg Hx  Fam Hx  Soc Hx      Reviewed and updated as needed this visit by Provider         ROS:  Constitutional, HEENT, cardiovascular, pulmonary, gi and gu systems are negative, except as otherwise noted.    OBJECTIVE:     /58  Pulse 58  Temp 98.1  F (36.7  C) (Oral)  Resp 16  Ht 5' 6.25\" (1.683 m)  Wt 148 lb (67.1 kg)  SpO2 98%  BMI 23.71 kg/m2  Body mass index is 23.71 kg/(m^2).  GENERAL: healthy, alert and no distress  MS: no gross musculoskeletal defects noted, no edema    ASSESSMENT/PLAN:   1. Age-related osteoporosis without current " pathological fracture  T-SCORES:Lumbar Spine L1-L4 T-score: -2.5     Left Hip (Total) T-score: -2.3  Right Hip (Total) T-score: -2.2     Hip lowest neck BMD: 0.770 gm/cm2.     FRAX 10-YEAR PROBABILITY OF FRACTURE*:  Major Osteoporotic: 17%  Hip: 2%    - alendronate (FOSAMAX) 70 MG tablet; Take 1 tablet (70 mg) by mouth with 8oz water every 7 days 30 minutes before breakfast and remain upright during this time.  Dispense: 4 tablet; Refill: 11  - DX Hip/Pelvis/Spine; Future    2. Need for vaccination  Discussed Shingrax- pros & cons  Willing to get it at pharmacy      Nancy Marie MD  [facility]

## 2018-09-12 ENCOUNTER — OFFICE VISIT (OUTPATIENT)
Dept: FAMILY MEDICINE | Facility: CLINIC | Age: 63
End: 2018-09-12
Payer: COMMERCIAL

## 2018-09-12 VITALS
SYSTOLIC BLOOD PRESSURE: 121 MMHG | DIASTOLIC BLOOD PRESSURE: 68 MMHG | TEMPERATURE: 98.2 F | OXYGEN SATURATION: 96 % | HEART RATE: 59 BPM | HEIGHT: 66 IN | BODY MASS INDEX: 23.78 KG/M2 | WEIGHT: 148 LBS | RESPIRATION RATE: 16 BRPM

## 2018-09-12 DIAGNOSIS — H61.21 IMPACTED CERUMEN OF RIGHT EAR: Primary | ICD-10-CM

## 2018-09-12 PROCEDURE — 69209 REMOVE IMPACTED EAR WAX UNI: CPT | Mod: RT | Performed by: FAMILY MEDICINE

## 2018-09-12 PROCEDURE — 99212 OFFICE O/P EST SF 10 MIN: CPT | Mod: 25 | Performed by: FAMILY MEDICINE

## 2018-09-12 NOTE — PROGRESS NOTES
"  SUBJECTIVE:   Marbella Welsh is a 63 year old female who presents to clinic today for the following health issues:      Concern - Ear pain  Onset: two weeks     Description:   Right ear pain     Intensity: moderate    Progression of Symptoms:  worsening    Accompanying Signs & Symptoms:  Hearing loss and pressure     Previous history of similar problem:   no    Precipitating factors:   Worsened by: nothing     Alleviating factors:  Improved by: nothing     Therapies Tried and outcome: patient used ear drops thinking it was wax     PROBLEMS TO ADD ON...    Problem list and histories reviewed & adjusted, as indicated.  Additional history: as documented    Patient Active Problem List   Diagnosis     Disorder of bone and cartilage     HYPERLIPIDEMIA LDL GOAL <160     Advanced directives, counseling/discussion     Tinnitus     Breast cancer screening     Past Surgical History:   Procedure Laterality Date     [place] wrist       C RAD RESEC TONSIL/PILLARS       C TOTAL ABDOM HYSTERECTOMY  2000     TOE SURGERY         Social History   Substance Use Topics     Smoking status: Former Smoker     Types: Cigarettes     Quit date: 1/23/1978     Smokeless tobacco: Never Used     Alcohol use Yes      Comment: 2 beers weekly     Family History   Problem Relation Age of Onset     Family History Negative No family hx of      only child and mom healthy           Reviewed and updated as needed this visit by clinical staff       Reviewed and updated as needed this visit by Provider         ROS:  Constitutional, HEENT, cardiovascular, pulmonary, gi and gu systems are negative, except as otherwise noted.    OBJECTIVE:     /68  Pulse 59  Temp 98.2  F (36.8  C) (Oral)  Resp 16  Ht 5' 6.25\" (1.683 m)  Wt 148 lb (67.1 kg)  SpO2 96%  BMI 23.71 kg/m2  Body mass index is 23.71 kg/(m^2).  GENERAL: healthy, alert and no distress  EYES: Eyes grossly normal to inspection, PERRL and conjunctivae and sclerae normal  HENT: normal " cephalic/atraumatic, left ear: occluded with wax, nose and mouth without ulcers or lesions, oropharynx clear and oral mucous membranes moist  NECK: no adenopathy, no asymmetry, masses, or scars and thyroid normal to palpation  RESP: lungs clear to auscultation - no rales, rhonchi or wheezes    Diagnostic Test Results:    ASSESSMENT/PLAN:   1. Impacted cerumen of right ear  Plan: wax removed by water irrigation by medical assitant   Post was- slight erythema of EC- TM retracted but no hyperemia  Her pressure symptoms relieved instantly and she is hearing louder  encouraged to call back as needed - if pain in affected ear    - REMOVE IMPACTED CERUMEN        Nancy Marie MD  [facility]

## 2018-09-12 NOTE — NURSING NOTE
"Chief Complaint   Patient presents with     Ear Problem     /68  Pulse 59  Temp 98.2  F (36.8  C) (Oral)  Resp 16  Ht 5' 6.25\" (1.683 m)  Wt 148 lb (67.1 kg)  SpO2 96%  BMI 23.71 kg/m2 Estimated body mass index is 23.71 kg/(m^2) as calculated from the following:    Height as of this encounter: 5' 6.25\" (1.683 m).    Weight as of this encounter: 148 lb (67.1 kg).  bp completed using cuff size: regular       Health Maintenance addressed:  NONE    n/a    Marianela Wren MA     "

## 2018-09-12 NOTE — MR AVS SNAPSHOT
"              After Visit Summary   9/12/2018    Marbella Welsh    MRN: [id number]           Patient Information     Date Of Birth          1955        Visit Information        Provider Department      9/12/2018 11:40 AM Nancy Marie MD [facility]        Today's Diagnoses     Impacted cerumen of right ear    -  1       Follow-ups after your visit        Who to contact     If you have questions or need follow up information about today's clinic visit or your schedule please contact [facility] directly at [phone].  Normal or non-critical lab and imaging results will be communicated to you by [employer], letter or phone within 4 business days after the clinic has received the results. If you do not hear from us within 7 days, please contact the clinic through [employer] or phone. If you have a critical or abnormal lab result, we will notify you by phone as soon as possible.  Submit refill requests through [employer] or call your pharmacy and they will forward the refill request to us. Please allow 3 business days for your refill to be completed.          Additional Information About Your Visit        MyChart Information     [employer] gives you secure access to your electronic health record. If you see a primary care provider, you can also send messages to your care team and make appointments. If you have questions, please call your primary care clinic.  If you do not have a primary care provider, please call [phone] and they will assist you.        Care EveryWhere ID     This is your Care EveryWhere ID. This could be used by other organizations to access your [place] medical records  [id number]        Your Vitals Were     Pulse Temperature Respirations Height Pulse Oximetry BMI (Body Mass Index)    59 98.2  F (36.8  C) (Oral) 16 5' 6.25\" (1.683 m) 96% 23.71 kg/m2       Blood Pressure from Last 3 Encounters:   09/12/18 121/68   06/19/18 117/58   05/11/18 116/63    Weight from Last 3 " Encounters:   09/12/18 148 lb (67.1 kg)   06/19/18 148 lb (67.1 kg)   05/11/18 149 lb 6.4 oz (67.8 kg)              We Performed the Following     REMOVE IMPACTED CERUMEN        Primary Care Provider Office Phone # Fax #    Nancy Marie [phone]       [address] [facility] [address]        Equal Access to Services     [facility]: Hadii aad ku hadasho Soomaali, waaxda luqadaha, qaybta kaalmada adeegyada, waxay idiin hayaan adeeg luis eafshinisra talavera . So [facility] [phone].    ATENCIÓN: Si yakelinla andrew, tiene a kline disposición servicios gratuitos de asistencia lingüística. [facility] [phone].    We comply with applicable federal civil rights laws and [place] laws. We do not discriminate on the basis of race, color, national origin, age, disability, sex, sexual orientation, or gender identity.            Thank you!     Thank you for choosing [facility]  for your care. Our goal is always to provide you with excellent care. Hearing back from our patients is one way we can continue to improve our services. Please take a few minutes to complete the written survey that you may receive in the mail after your visit with us. Thank you!             Your Updated Medication List - Protect others around you: Learn how to safely use, store and throw away your medicines at www.disposemymeds.org.          This list is accurate as of 9/12/18 12:17 PM.  Always use your most recent med list.                   Brand Name Dispense Instructions for use Diagnosis    alendronate 70 MG tablet    FOSAMAX    4 tablet    Take 1 tablet (70 mg) by mouth with 8oz water every 7 days 30 minutes before breakfast and remain upright during this time.    Age-related osteoporosis without current pathological fracture       MAGNESIUM OXIDE PO           MULTIVITAL Tabs      1 TABLET DAILY        VITAMIN D (CHOLECALCIFEROL) PO      Take 1,000 Units by mouth daily

## 2018-11-30 ENCOUNTER — TRANSFERRED RECORDS (OUTPATIENT)
Dept: HEALTH INFORMATION MANAGEMENT | Facility: CLINIC | Age: 63
End: 2018-11-30

## 2019-05-15 DIAGNOSIS — M81.0 AGE-RELATED OSTEOPOROSIS WITHOUT CURRENT PATHOLOGICAL FRACTURE: ICD-10-CM

## 2019-05-16 RX ORDER — ALENDRONATE SODIUM 70 MG/1
TABLET ORAL
Start: 2019-05-16

## 2019-05-16 NOTE — TELEPHONE ENCOUNTER
"Denied  Too early; Rx sent 6/19/2018 for 1 year  Nuria BAUTISTA RN    Last Written Prescription Date:  6/19/2018  Last Fill Quantity: 4,  # refills: 11  Last office visit: 9/12/2018 with prescribing provider:     Future Office Visit:    Requested Prescriptions   Pending Prescriptions Disp Refills     alendronate (FOSAMAX) 70 MG tablet [Pharmacy Med Name: ALENDRONATE SODIUM 70 MG TAB] 4 tablet 11     Sig: TAKE 1 TABLET BY MOUTH WITH 8 OZ OF WATER ONCE WEEKLY (EVERY 7 DAYS) 30 MINUTES BEFORE BREAKFAST       Bisphosphonates Failed - 5/15/2019  7:51 PM        Failed - Normal serum creatinine on file within past 12 months     Recent Labs   Lab Test 06/25/11   CR 0.64             Passed - Recent (12 mo) or future (30 days) visit within the authorizing provider's specialty     Patient had office visit in the last 12 months or has a visit in the next 30 days with authorizing provider or within the authorizing provider's specialty.  See \"Patient Info\" tab in inbasket, or \"Choose Columns\" in Meds & Orders section of the refill encounter.              Passed - Dexa on file within past 2 years     Please review last Dexa result.           Passed - Medication is active on med list        Passed - Patient is age 18 or older          "

## 2019-06-01 ENCOUNTER — NURSE TRIAGE (OUTPATIENT)
Dept: NURSING | Facility: CLINIC | Age: 64
End: 2019-06-01

## 2019-06-01 DIAGNOSIS — M81.0 AGE-RELATED OSTEOPOROSIS WITHOUT CURRENT PATHOLOGICAL FRACTURE: ICD-10-CM

## 2019-06-01 NOTE — TELEPHONE ENCOUNTER
"Patient calling abut Fosamax. She takes one tablet every 7 days.    She is out of medication. Refill request sent to clinic by pharmacy. Patient will call clinic Monday if no reply by noon.    Protocol and care advice reviewed  Caller states understanding of the recommended disposition  Advised to call back if further questions or concerns      Reason for Disposition    Caller requesting a NON-URGENT new prescription or refill and triager unable to refill per unit policy    Additional Information    Negative: [1] DOUBLE DOSE (an extra dose or lesser amount) of prescription drug AND [2] NO symptoms (Exception: a double dose of antibiotics)    Negative: Diabetes drug error or overdose (e.g., insulin or extra dose)    Negative: [1] Request for URGENT new prescription or refill of \"essential\" medication (i.e., likelihood of harm to patient if not taken) AND [2] triager unable to fill per unit policy    Negative: [1] Prescription not at pharmacy AND [2] was prescribed today by PCP    Negative: Pharmacy calling with prescription questions and triager unable to answer question    Negative: Caller has URGENT medication question about med that PCP prescribed and triager unable to answer question    Protocols used: MEDICATION QUESTION CALL-A-AH      "

## 2019-06-03 NOTE — TELEPHONE ENCOUNTER
"Sent Amberhart to pt   Due for physical  Nuria BAUTISTA RN    Last Written Prescription Date:  6/19/2018  Last Fill Quantity: 4,  # refills: 11   Last office visit: 9/12/2018 with prescribing provider:     Future Office Visit:    Requested Prescriptions   Pending Prescriptions Disp Refills     alendronate (FOSAMAX) 70 MG tablet [Pharmacy Med Name: ALENDRONATE SODIUM 70 MG TAB] 4 tablet 11     Sig: TAKE 1 TABLET BY MOUTH WITH 8 OZ OF WATER ONCE WEEKLY (EVERY 7 DAYS) 30 MINUTES BEFORE BREAKFAST       Bisphosphonates Failed - 6/1/2019 10:48 AM        Failed - Normal serum creatinine on file within past 12 months     Recent Labs   Lab Test 06/25/11   CR 0.64             Passed - Recent (12 mo) or future (30 days) visit within the authorizing provider's specialty     Patient had office visit in the last 12 months or has a visit in the next 30 days with authorizing provider or within the authorizing provider's specialty.  See \"Patient Info\" tab in inbasket, or \"Choose Columns\" in Meds & Orders section of the refill encounter.              Passed - Dexa on file within past 2 years     Please review last Dexa result.           Passed - Medication is active on med list        Passed - Patient is age 18 or older          "

## 2019-06-04 ENCOUNTER — OFFICE VISIT (OUTPATIENT)
Dept: FAMILY MEDICINE | Facility: CLINIC | Age: 64
End: 2019-06-04
Payer: COMMERCIAL

## 2019-06-04 VITALS
RESPIRATION RATE: 16 BRPM | BODY MASS INDEX: 22.76 KG/M2 | WEIGHT: 145 LBS | OXYGEN SATURATION: 95 % | DIASTOLIC BLOOD PRESSURE: 48 MMHG | SYSTOLIC BLOOD PRESSURE: 117 MMHG | TEMPERATURE: 98.9 F | HEIGHT: 67 IN | HEART RATE: 54 BPM

## 2019-06-04 DIAGNOSIS — Z23 NEED FOR VACCINATION: ICD-10-CM

## 2019-06-04 DIAGNOSIS — M81.0 AGE-RELATED OSTEOPOROSIS WITHOUT CURRENT PATHOLOGICAL FRACTURE: ICD-10-CM

## 2019-06-04 DIAGNOSIS — E78.5 HYPERLIPIDEMIA LDL GOAL <160: ICD-10-CM

## 2019-06-04 DIAGNOSIS — Z00.00 WELCOME TO MEDICARE PREVENTIVE VISIT: Primary | ICD-10-CM

## 2019-06-04 LAB
ALBUMIN SERPL-MCNC: 3.9 G/DL (ref 3.4–5)
ALP SERPL-CCNC: 66 U/L (ref 40–150)
ALT SERPL W P-5'-P-CCNC: 37 U/L (ref 0–50)
ANION GAP SERPL CALCULATED.3IONS-SCNC: 9 MMOL/L (ref 3–14)
AST SERPL W P-5'-P-CCNC: 27 U/L (ref 0–45)
BASOPHILS # BLD AUTO: 0 10E9/L (ref 0–0.2)
BASOPHILS NFR BLD AUTO: 0.2 %
BILIRUB SERPL-MCNC: 0.4 MG/DL (ref 0.2–1.3)
BUN SERPL-MCNC: 17 MG/DL (ref 7–30)
CALCIUM SERPL-MCNC: 9.2 MG/DL (ref 8.5–10.1)
CHLORIDE SERPL-SCNC: 107 MMOL/L (ref 94–109)
CO2 SERPL-SCNC: 25 MMOL/L (ref 20–32)
CREAT SERPL-MCNC: 0.64 MG/DL (ref 0.52–1.04)
DEPRECATED CALCIDIOL+CALCIFEROL SERPL-MC: 62 UG/L (ref 20–75)
DIFFERENTIAL METHOD BLD: NORMAL
EOSINOPHIL # BLD AUTO: 0.3 10E9/L (ref 0–0.7)
EOSINOPHIL NFR BLD AUTO: 4.4 %
ERYTHROCYTE [DISTWIDTH] IN BLOOD BY AUTOMATED COUNT: 13.3 % (ref 10–15)
GFR SERPL CREATININE-BSD FRML MDRD: >90 ML/MIN/{1.73_M2}
GLUCOSE SERPL-MCNC: 94 MG/DL (ref 70–99)
HCT VFR BLD AUTO: 41.6 % (ref 35–47)
HGB BLD-MCNC: 13.9 G/DL (ref 11.7–15.7)
LDLC SERPL DIRECT ASSAY-MCNC: 112 MG/DL
LYMPHOCYTES # BLD AUTO: 1.5 10E9/L (ref 0.8–5.3)
LYMPHOCYTES NFR BLD AUTO: 22.8 %
MCH RBC QN AUTO: 30.5 PG (ref 26.5–33)
MCHC RBC AUTO-ENTMCNC: 33.4 G/DL (ref 31.5–36.5)
MCV RBC AUTO: 91 FL (ref 78–100)
MONOCYTES # BLD AUTO: 0.6 10E9/L (ref 0–1.3)
MONOCYTES NFR BLD AUTO: 9.6 %
NEUTROPHILS # BLD AUTO: 4.2 10E9/L (ref 1.6–8.3)
NEUTROPHILS NFR BLD AUTO: 63 %
PLATELET # BLD AUTO: 253 10E9/L (ref 150–450)
POTASSIUM SERPL-SCNC: 4.6 MMOL/L (ref 3.4–5.3)
PROT SERPL-MCNC: 7.5 G/DL (ref 6.8–8.8)
RBC # BLD AUTO: 4.55 10E12/L (ref 3.8–5.2)
SODIUM SERPL-SCNC: 141 MMOL/L (ref 133–144)
WBC # BLD AUTO: 6.7 10E9/L (ref 4–11)

## 2019-06-04 PROCEDURE — 85025 COMPLETE CBC W/AUTO DIFF WBC: CPT | Performed by: PHYSICIAN ASSISTANT

## 2019-06-04 PROCEDURE — 90670 PCV13 VACCINE IM: CPT | Performed by: PHYSICIAN ASSISTANT

## 2019-06-04 PROCEDURE — 80053 COMPREHEN METABOLIC PANEL: CPT | Performed by: PHYSICIAN ASSISTANT

## 2019-06-04 PROCEDURE — 82306 VITAMIN D 25 HYDROXY: CPT | Performed by: PHYSICIAN ASSISTANT

## 2019-06-04 PROCEDURE — 36415 COLL VENOUS BLD VENIPUNCTURE: CPT | Performed by: PHYSICIAN ASSISTANT

## 2019-06-04 PROCEDURE — 83721 ASSAY OF BLOOD LIPOPROTEIN: CPT | Performed by: PHYSICIAN ASSISTANT

## 2019-06-04 PROCEDURE — G0402 INITIAL PREVENTIVE EXAM: HCPCS | Performed by: PHYSICIAN ASSISTANT

## 2019-06-04 PROCEDURE — G0009 ADMIN PNEUMOCOCCAL VACCINE: HCPCS | Performed by: PHYSICIAN ASSISTANT

## 2019-06-04 RX ORDER — ALENDRONATE SODIUM 70 MG/1
TABLET ORAL
Start: 2019-06-04

## 2019-06-04 RX ORDER — ALENDRONATE SODIUM 70 MG/1
TABLET ORAL
Qty: 12 TABLET | Refills: 3 | Status: SHIPPED | OUTPATIENT
Start: 2019-06-04 | End: 2020-05-01

## 2019-06-04 ASSESSMENT — ACTIVITIES OF DAILY LIVING (ADL): CURRENT_FUNCTION: NO ASSISTANCE NEEDED

## 2019-06-04 ASSESSMENT — MIFFLIN-ST. JEOR: SCORE: 1227.41

## 2019-06-04 NOTE — PROGRESS NOTES
"SUBJECTIVE:   Marbella Welsh is a 64 year old female who presents for Preventive Visit.      Are you in the first 12 months of your Medicare coverage?  Yes,  Visual Acuity:  Right Eye: 20/25   Left Eye: 20/25  Both Eyes: 20/25    Healthy Habits:     In general, how would you rate your overall health?  Excellent    Frequency of exercise:  6-7 days/week    Duration of exercise:  45-60 minutes    Do you usually eat at least 4 servings of fruit and vegetables a day, include whole grains    & fiber and avoid regularly eating high fat or \"junk\" foods?  Yes    Taking medications regularly:  Yes    Medication side effects:  None    Ability to successfully perform activities of daily living:  No assistance needed    Home Safety:  No safety concerns identified    Hearing Impairment:  No hearing concerns    In the past 6 months, have you been bothered by leaking of urine?  No    In general, how would you rate your overall mental or emotional health?  Excellent      PHQ-2 Total Score: 0    Additional concerns today:  No    Do you feel safe in your environment? Yes    Do you have a Health Care Directive? No: Advance care planning reviewed with patient; information given to patient to review.      Fall risk  Fallen 2 or more times in the past year?: No  Any fall with injury in the past year?: No    Cognitive Screening   1) Repeat 3 items (Leader, Season, Table)    2) Clock draw: NORMAL  3) 3 item recall: Recalls 3 objects  Results: 3 items recalled: COGNITIVE IMPAIRMENT LESS LIKELY    Mini-CogTM Copyright S Bryant. Licensed by the author for use in [facility]; reprinted with permission (deo@.[facility]). All rights reserved.      Do you have sleep apnea, excessive snoring or daytime drowsiness?: no    Reviewed and updated as needed this visit by clinical staff  Tobacco  Allergies  Meds  Problems  Med Hx  Surg Hx  Fam Hx  Soc Hx          Reviewed and updated as needed this visit by Provider  Allergies  Meds  " Problems        Social History     Tobacco Use     Smoking status: Former Smoker     Types: Cigarettes     Last attempt to quit: 1978     Years since quittin.3     Smokeless tobacco: Never Used   Substance Use Topics     Alcohol use: Yes     Comment: 2 beers weekly         Alcohol Use 2019   Prescreen: >3 drinks/day or >7 drinks/week? No   Prescreen: >3 drinks/day or >7 drinks/week? -   No flowsheet data found.        She has been on fosamax for just about 1 year.  Tolerates well, no side effects.  Due for refills.    Current providers sharing in care for this patient include:   Patient Care Team:  Nancy Marie MD as PCP - General (Family Practice)  Nancy Marie MD as Assigned PCP    The following health maintenance items are reviewed in Epic and correct as of today:  Health Maintenance   Topic Date Due     HIV SCREENING  1970     ZOSTER IMMUNIZATION (2 of 3) 2013     ADVANCED DIRECTIVE PLANNING  2018     PNEUMOCOCCAL IMMUNIZATION 65+ LOW/MEDIUM RISK (1 of 2 - PCV13) 2019     MEDICARE ANNUAL WELLNESS VISIT  2019     INFLUENZA VACCINE (Season Ended) 2019     DEXA  2020     MAMMO SCREENING  2020     COLONOSCOPY  2021     LIPID  10/24/2022     DTAP/TDAP/TD IMMUNIZATION (2 - Td) 2023     HEPATITIS C SCREENING  Completed     PHQ-2  Completed     IPV IMMUNIZATION  Aged Out     MENINGITIS IMMUNIZATION  Aged Out     BP Readings from Last 3 Encounters:   19 117/48   18 121/68   18 117/58    Wt Readings from Last 3 Encounters:   19 65.8 kg (145 lb)   18 67.1 kg (148 lb)   18 67.1 kg (148 lb)                  Pneumonia Vaccine:Adults age 65+ who have not received previous Pneumovax (PPSV23) or PCV13 as an adult: Should first be given PCV13 AND then should be given PPSV23 6-12 months after PCV13  Mammogram Screening: Mammogram Screening: Patient over age 50, mutual decision to screen reflected in health  "maintenance.    Review of Systems  Constitutional, HEENT, cardiovascular, pulmonary, gi and gu systems are negative, except as otherwise noted.    OBJECTIVE:   /48 (BP Location: Left arm, Cuff Size: Adult Regular)   Pulse 54   Temp 98.9  F (37.2  C) (Oral)   Resp 16   Ht 1.689 m (5' 6.5\")   Wt 65.8 kg (145 lb)   SpO2 95%   BMI 23.05 kg/m   Estimated body mass index is 23.05 kg/m  as calculated from the following:    Height as of this encounter: 1.689 m (5' 6.5\").    Weight as of this encounter: 65.8 kg (145 lb).  Physical Exam  GENERAL: alert and no distress  EYES: Eyes grossly normal to inspection, PERRL and conjunctivae and sclerae normal  HENT: ear canals and TM's normal, nose and mouth without ulcers or lesions  NECK: no adenopathy, no asymmetry, masses, or scars and thyroid normal to palpation  RESP: lungs clear to auscultation - no rales, rhonchi or wheezes  CV: regular rate and rhythm, normal S1 S2, no S3 or S4, no murmur, click or rub, no peripheral edema and peripheral pulses strong  MS: no gross musculoskeletal defects noted, no edema  SKIN: no suspicious lesions or rashes  NEURO: Normal strength and tone, mentation intact and speech normal  PSYCH: mentation appears normal, affect normal/bright    Diagnostic Test Results:  Labs reviewed in Epic    ASSESSMENT / PLAN:   1. Welcome to Medicare preventive visit  Doing very well  - CBC with platelets differential  - Comprehensive metabolic panel    2. Age-related osteoporosis without current pathological fracture  Will update DEXA next year  - alendronate (FOSAMAX) 70 MG tablet; Take 1 tablet (70 mg) by mouth with 8oz water every 7 days 30 minutes before breakfast and remain upright during this time.  Dispense: 12 tablet; Refill: 3  - CBC with platelets differential  - Comprehensive metabolic panel  - Vitamin D Deficiency    3. Hyperlipidemia LDL goal <160    - LDL cholesterol direct    4. Need for vaccination    - Pneumococcal vaccine 13 valent " "PCV13 IM (Prevnar) [[id number]]  - ADMIN MEDICARE: Pneumococcal Vaccine ()    End of Life Planning:  Patient currently has an advanced directive: No.  I have verified the patient's ablity to prepare an advanced directive/make health care decisions.  Literature was provided to assist patient in preparing an advanced directive.    COUNSELING:  Reviewed preventive health counseling, as reflected in patient instructions       Regular exercise       Healthy diet/nutrition       Vision screening       Dental care       Immunizations    Vaccinated for: Pneumococcal             Osteoporosis Prevention/Bone Health       Colon cancer screening    Estimated body mass index is 23.05 kg/m  as calculated from the following:    Height as of this encounter: 1.689 m (5' 6.5\").    Weight as of this encounter: 65.8 kg (145 lb).         reports that she quit smoking about 41 years ago. Her smoking use included cigarettes. She has never used smokeless tobacco.      Appropriate preventive services were discussed with this patient, including applicable screening as appropriate for cardiovascular disease, diabetes, osteopenia/osteoporosis, and glaucoma.  As appropriate for age/gender, discussed screening for colorectal cancer, prostate cancer, breast cancer, and cervical cancer. Checklist reviewing preventive services available has been given to the patient.    Reviewed patients plan of care and provided an AVS. The Basic Care Plan (routine screening as documented in Health Maintenance) for Marbella meets the Care Plan requirement. This Care Plan has been established and reviewed with the Patient.    Counseling Resources:  ATP IV Guidelines  Pooled Cohorts Equation Calculator  Breast Cancer Risk Calculator  FRAX Risk Assessment  ICSI Preventive Guidelines  Dietary Guidelines for Americans, 2010  USDA's MyPlate  ASA Prophylaxis  Lung CA Screening    Parker Jimenez PA-C  [facility]    Identified Health Risks:  "

## 2019-06-04 NOTE — PATIENT INSTRUCTIONS
Patient Education   Personalized Prevention Plan  You are due for the preventive services outlined below.  Your care team is available to assist you in scheduling these services.  If you have already completed any of these items, please share that information with your care team to update in your medical record.  Health Maintenance Due   Topic Date Due     One-time HIV Screening  05/21/1970     Zoster (Shingles) Vaccine (2 of 3) 12/02/2013     Discuss Advance Directive Planning  06/05/2018     Pneumococcal Vaccine (1 of 2 - PCV13) 05/21/2019     Annual Wellness Visit  05/21/2019

## 2019-06-04 NOTE — NURSING NOTE
"Chief Complaint   Patient presents with     Medicare Visit     initial /48 (BP Location: Left arm, Cuff Size: Adult Regular)   Pulse 54   Temp 98.9  F (37.2  C) (Oral)   Resp 16   Ht 1.689 m (5' 6.5\")   Wt 65.8 kg (145 lb)   SpO2 95%   BMI 23.05 kg/m   Estimated body mass index is 23.05 kg/m  as calculated from the following:    Height as of this encounter: 1.689 m (5' 6.5\").    Weight as of this encounter: 65.8 kg (145 lb).  BP completed using cuff size: regular.  L  arm      Health Maintenance that is potentially due pending provider review:  NONE    n/a    Jay Rajan ma  "

## 2019-06-04 NOTE — RESULT ENCOUNTER NOTE
Dear Marbella    Your test results are attached, feel free to contact me via Rafter    Overall, your labs look pretty good.  Cholesterol does not appear to be an issues.  No problems with blood sugar, liver or kidney function.  Your vitamin D level looks good as well, so I would continue with your supplement.  Let me know if you have any questions.    Mart Jimenez PA-C

## 2019-12-09 ENCOUNTER — HEALTH MAINTENANCE LETTER (OUTPATIENT)
Age: 64
End: 2019-12-09

## 2019-12-09 ENCOUNTER — TRANSFERRED RECORDS (OUTPATIENT)
Dept: HEALTH INFORMATION MANAGEMENT | Facility: CLINIC | Age: 64
End: 2019-12-09

## 2020-02-27 ENCOUNTER — OFFICE VISIT (OUTPATIENT)
Dept: FAMILY MEDICINE | Facility: CLINIC | Age: 65
End: 2020-02-27
Payer: COMMERCIAL

## 2020-02-27 VITALS
RESPIRATION RATE: 14 BRPM | TEMPERATURE: 98 F | OXYGEN SATURATION: 98 % | BODY MASS INDEX: 23.28 KG/M2 | WEIGHT: 148.3 LBS | HEART RATE: 74 BPM | SYSTOLIC BLOOD PRESSURE: 136 MMHG | DIASTOLIC BLOOD PRESSURE: 90 MMHG | HEIGHT: 67 IN

## 2020-02-27 DIAGNOSIS — H66.001 NON-RECURRENT ACUTE SUPPURATIVE OTITIS MEDIA OF RIGHT EAR WITHOUT SPONTANEOUS RUPTURE OF TYMPANIC MEMBRANE: Primary | ICD-10-CM

## 2020-02-27 PROCEDURE — 99213 OFFICE O/P EST LOW 20 MIN: CPT | Performed by: PHYSICIAN ASSISTANT

## 2020-02-27 RX ORDER — AMOXICILLIN 875 MG
875 TABLET ORAL 2 TIMES DAILY
Qty: 20 TABLET | Refills: 0 | Status: SHIPPED | OUTPATIENT
Start: 2020-02-27 | End: 2020-03-24

## 2020-02-27 ASSESSMENT — PAIN SCALES - GENERAL: PAINLEVEL: MILD PAIN (2)

## 2020-02-27 ASSESSMENT — MIFFLIN-ST. JEOR: SCORE: 1250.31

## 2020-02-27 NOTE — PROGRESS NOTES
"Subjective     Marbella Welsh is a 64 year old female who presents to clinic today for the following health issues:    HPI     Patient in for RT ear pain and Dizziness/headache  on RT side of head states feels like a sinus headache .       RESPIRATORY SYMPTOMS      Duration: 7-10 days    Description  facial pain/pressure and ear pain right    Severity: mild    Accompanying signs and symptoms: dizziness    History (predisposing factors):  none    Precipitating or alleviating factors: None    Therapies tried and outcome:  none      BP Readings from Last 3 Encounters:   02/27/20 (!) 136/90   06/04/19 117/48   09/12/18 121/68    Wt Readings from Last 3 Encounters:   02/27/20 67.3 kg (148 lb 4.8 oz)   06/04/19 65.8 kg (145 lb)   09/12/18 67.1 kg (148 lb)                      Reviewed and updated as needed this visit by Provider         Review of Systems   ROS COMP: Constitutional, HEENT, cardiovascular, pulmonary, gi and gu systems are negative, except as otherwise noted.      Objective    BP (!) 136/90 (BP Location: Left arm, Patient Position: Sitting, Cuff Size: Adult Regular)   Pulse 74   Temp 98  F (36.7  C) (Oral)   Resp 14   Ht 1.702 m (5' 7\")   Wt 67.3 kg (148 lb 4.8 oz)   SpO2 98%   BMI 23.23 kg/m    Body mass index is 23.23 kg/m .  Physical Exam   GENERAL: alert and no distress  EYES: Eyes grossly normal to inspection  HENT: normal cephalic/atraumatic, right ear: erythematous, left ear: normal: no effusions, no erythema, normal landmarks, nose and mouth without ulcers or lesions, oropharynx clear and oral mucous membranes moist  RESP: lungs clear to auscultation - no rales, rhonchi or wheezes  CV: regular rate and rhythm, normal S1 S2, no S3 or S4, no murmur, click or rub, no peripheral edema and peripheral pulses strong  NEURO: Normal strength and tone, mentation intact and speech normal  PSYCH: mentation appears normal, affect normal/bright    Diagnostic Test Results:  Labs reviewed in Epic    "     Assessment & Plan     1. Non-recurrent acute suppurative otitis media of right ear without spontaneous rupture of tympanic membrane    - amoxicillin (AMOXIL) 875 MG tablet; Take 1 tablet (875 mg) by mouth 2 times daily  Dispense: 20 tablet; Refill: 0           Return in about 1 week (around 3/5/2020) for If symptoms persist or worsen.    Parker Jimenez PA-C  [facility]

## 2020-03-24 ENCOUNTER — VIRTUAL VISIT (OUTPATIENT)
Dept: FAMILY MEDICINE | Facility: CLINIC | Age: 65
End: 2020-03-24
Payer: COMMERCIAL

## 2020-03-24 ENCOUNTER — TELEPHONE (OUTPATIENT)
Dept: FAMILY MEDICINE | Facility: CLINIC | Age: 65
End: 2020-03-24

## 2020-03-24 DIAGNOSIS — R51.9 SINUS HEADACHE: ICD-10-CM

## 2020-03-24 PROCEDURE — 99213 OFFICE O/P EST LOW 20 MIN: CPT | Mod: TEL | Performed by: FAMILY MEDICINE

## 2020-03-24 NOTE — PROGRESS NOTES
"Marbella Welsh is a 64 year old female who is being evaluated via a billable telephone visit.      The patient has been notified of following:     \"This telephone visit will be conducted via a call between you and your physician/provider. We have found that certain health care needs can be provided without the need for a physical exam.  This service lets us provide the care you need with a short phone conversation.  If a prescription is necessary we can send it directly to your pharmacy.  If lab work is needed we can place an order for that and you can then stop by our lab to have the test done at a later time.    If during the course of the call the physician/provider feels a telephone visit is not appropriate, you will not be charged for this service.\"     Marbella Welsh complains of    Chief Complaint   Patient presents with     Telephone     Otalgia       I have reviewed and updated the patient's Past Medical History, Social History, Family History and Medication List.    ALLERGIES  Nkda [no known drug allergies]    Additional provider notes: sinus headache recurred since a few days, < 5  Was given antibiotic for 10 days for sinus congestion and acute sinusitis on 2/27- and felt better- finished antibiotic 3/7 and remained symptoms free but now mostly right sided sinus headache. Minimal sinus congestion. No fever. No travel history . No COVID known exposure  Wondering about repeat antibiotic  No sneezing- unusual.  No cough  Over all feeling well in her usual state of good health  Non smoker and no asthma      Assessment/Plan:  1. Sinus headache  symptomatic treatment is advised with  -good hydration  -over the counter acetaminophen 1-2 tabs three times daily as needed  For next 3-5 days  -steam inhalation  -if sneezing, runny nose, consider over the counter antihistamine once daily - like Claritin, or zyrtec, or allegra.    If sinus congestion increase with sinus and or fascial pressure than super added bacterial " infection of sinus should be treated with antibiotic- that's been sent to the pharmacy today- but should not be started as yet .  - amoxicillin-clavulanate (AUGMENTIN) 875-125 MG tablet; Take 1 tablet by mouth 2 times daily  Dispense: 20 tablet; Refill: 0    Phone call duration:  8 minutes    Nancy Marie MD

## 2020-03-24 NOTE — PATIENT INSTRUCTIONS
Sinus headache    We discussed the sinus headache and sinus congestion today,    symptomatic treatment is advised with  -good hydration  -over the counter acetaminophen 1-2 tabs three times daily as needed  For next 3-5 days  -steam inhalation  -if sneezing, runny nose, consider over the counter antihistamine once daily - like Claritin, or zyrtec, or allegra.    If sinus congestion increase with sinus and or fascial pressure than super added bacterial infection of sinus should be treated with antibiotic- that's been sent to the pharmacy today- but should not be started as yet .  - amoxicillin-clavulanate (AUGMENTIN) 875-125 MG tablet; Take 1 tablet by mouth 2 times daily  Dispense: 20 tablet; Refill: 0    Keep us posted with unresolved concerns or any questions with medications    Practice COVID precautions by social distancing, keeping cough covered, good hand washing.    Please keep us posted with questions or concerns .      Best Regards,    Nanyc Marie MD  [facility]  [phone]

## 2020-03-24 NOTE — TELEPHONE ENCOUNTER
A.S.  Please advise in JS's absence  Seen on 2/27/20 for ear infection started amoxicillin 875 mg right away and took for 10 days, ear pain got better then couple days ago pt's ear pain came back.  Ear feels hot internally and tight, as before. Gets random stabbing pain. Hearing isn't muffled but sometimes gets high pitched sounds.    No temp, doesn't feel sick, and no dizziness like before.   Another abx or should come back in?    Thank you,  Pauly Kuo RN

## 2020-03-24 NOTE — TELEPHONE ENCOUNTER
Patient informed    Next 5 appointments (look out 90 days)    Mar 24, 2020  1:40 PM CDT  Telephone Visit with Nancy Marie MD  [facility] ([facility] [address] [place]  [facility] [address]  [phone]        Nuria BAUTISTA RN

## 2020-04-25 DIAGNOSIS — M81.0 AGE-RELATED OSTEOPOROSIS WITHOUT CURRENT PATHOLOGICAL FRACTURE: ICD-10-CM

## 2020-04-26 RX ORDER — ALENDRONATE SODIUM 70 MG/1
TABLET ORAL
Qty: 12 TABLET | Refills: 3 | OUTPATIENT
Start: 2020-04-26

## 2020-04-30 ENCOUNTER — MYC REFILL (OUTPATIENT)
Dept: FAMILY MEDICINE | Facility: CLINIC | Age: 65
End: 2020-04-30

## 2020-04-30 DIAGNOSIS — M81.0 AGE-RELATED OSTEOPOROSIS WITHOUT CURRENT PATHOLOGICAL FRACTURE: ICD-10-CM

## 2020-04-30 RX ORDER — ALENDRONATE SODIUM 70 MG/1
TABLET ORAL
Qty: 12 TABLET | Refills: 3 | Status: CANCELLED | OUTPATIENT
Start: 2020-04-30

## 2020-04-30 NOTE — TELEPHONE ENCOUNTER
"Requested Prescriptions   Pending Prescriptions Disp Refills     alendronate (FOSAMAX) 70 MG tablet [Pharmacy Med Name: ALENDRONATE SODIUM 70 MG TAB]  Last Written Prescription Date:  6/4/2019  Last Fill Quantity: 12 tablet,  # refills: 3   Last office visit: 3/24/2020 with prescribing provider:  Cynthia   Future Office Visit:     12 tablet 3     Sig: TAKE 1 TAB BY MOUTH WITH 8OZ H2O EVERY 7 DAYS 30 MINS BEFORE BREAKFAST. REMAIN UPRIGHT.       Bisphosphonates Passed - 4/30/2020 11:04 AM        Passed - Recent (12 mo) or future (30 days) visit within the authorizing provider's specialty     Patient has had an office visit with the authorizing provider or a provider within the authorizing providers department within the previous 12 mos or has a future within next 30 days. See \"Patient Info\" tab in inbasket, or \"Choose Columns\" in Meds & Orders section of the refill encounter.              Passed - Dexa on file within past 2 years     Please review last Dexa result.           Passed - Medication is active on med list        Passed - Patient is age 18 or older        Passed - Normal serum creatinine on file within past 12 months     Recent Labs   Lab Test 06/04/19  0909   CR 0.64       Ok to refill medication if creatinine is low                "

## 2020-05-01 RX ORDER — ALENDRONATE SODIUM 70 MG/1
TABLET ORAL
Qty: 12 TABLET | Refills: 0 | Status: SHIPPED | OUTPATIENT
Start: 2020-05-01 | End: 2020-07-24

## 2020-07-17 DIAGNOSIS — M81.0 AGE-RELATED OSTEOPOROSIS WITHOUT CURRENT PATHOLOGICAL FRACTURE: ICD-10-CM

## 2020-07-24 ENCOUNTER — OFFICE VISIT (OUTPATIENT)
Dept: FAMILY MEDICINE | Facility: CLINIC | Age: 65
End: 2020-07-24
Payer: COMMERCIAL

## 2020-07-24 VITALS
WEIGHT: 142.2 LBS | BODY MASS INDEX: 22.85 KG/M2 | OXYGEN SATURATION: 96 % | DIASTOLIC BLOOD PRESSURE: 76 MMHG | HEART RATE: 52 BPM | SYSTOLIC BLOOD PRESSURE: 134 MMHG | TEMPERATURE: 98.4 F | HEIGHT: 66 IN

## 2020-07-24 DIAGNOSIS — Z00.00 ENCOUNTER FOR MEDICARE ANNUAL WELLNESS EXAM: Primary | ICD-10-CM

## 2020-07-24 DIAGNOSIS — M81.0 AGE-RELATED OSTEOPOROSIS WITHOUT CURRENT PATHOLOGICAL FRACTURE: ICD-10-CM

## 2020-07-24 LAB
ANION GAP SERPL CALCULATED.3IONS-SCNC: 7 MMOL/L (ref 3–14)
BUN SERPL-MCNC: 12 MG/DL (ref 7–30)
CALCIUM SERPL-MCNC: 9.1 MG/DL (ref 8.5–10.1)
CHLORIDE SERPL-SCNC: 107 MMOL/L (ref 94–109)
CHOLEST SERPL-MCNC: 233 MG/DL
CO2 SERPL-SCNC: 24 MMOL/L (ref 20–32)
CREAT SERPL-MCNC: 0.57 MG/DL (ref 0.52–1.04)
GFR SERPL CREATININE-BSD FRML MDRD: >90 ML/MIN/{1.73_M2}
GLUCOSE SERPL-MCNC: 95 MG/DL (ref 70–99)
HDLC SERPL-MCNC: 72 MG/DL
LDLC SERPL CALC-MCNC: 143 MG/DL
NONHDLC SERPL-MCNC: 161 MG/DL
POTASSIUM SERPL-SCNC: 4 MMOL/L (ref 3.4–5.3)
SODIUM SERPL-SCNC: 138 MMOL/L (ref 133–144)
TRIGL SERPL-MCNC: 91 MG/DL

## 2020-07-24 PROCEDURE — 36415 COLL VENOUS BLD VENIPUNCTURE: CPT | Performed by: FAMILY MEDICINE

## 2020-07-24 PROCEDURE — 99397 PER PM REEVAL EST PAT 65+ YR: CPT | Mod: 25 | Performed by: FAMILY MEDICINE

## 2020-07-24 PROCEDURE — 80061 LIPID PANEL: CPT | Performed by: FAMILY MEDICINE

## 2020-07-24 PROCEDURE — 82306 VITAMIN D 25 HYDROXY: CPT | Performed by: FAMILY MEDICINE

## 2020-07-24 PROCEDURE — 90732 PPSV23 VACC 2 YRS+ SUBQ/IM: CPT | Performed by: FAMILY MEDICINE

## 2020-07-24 PROCEDURE — 90471 IMMUNIZATION ADMIN: CPT | Performed by: FAMILY MEDICINE

## 2020-07-24 PROCEDURE — 80048 BASIC METABOLIC PNL TOTAL CA: CPT | Performed by: FAMILY MEDICINE

## 2020-07-24 RX ORDER — ALENDRONATE SODIUM 70 MG/1
TABLET ORAL
Qty: 12 TABLET | Refills: 4 | Status: SHIPPED | OUTPATIENT
Start: 2020-07-24 | End: 2021-09-15

## 2020-07-24 RX ORDER — ALENDRONATE SODIUM 70 MG/1
TABLET ORAL
Start: 2020-07-24

## 2020-07-24 ASSESSMENT — ACTIVITIES OF DAILY LIVING (ADL): CURRENT_FUNCTION: NO ASSISTANCE NEEDED

## 2020-07-24 ASSESSMENT — MIFFLIN-ST. JEOR: SCORE: 1206.52

## 2020-07-24 NOTE — RESULT ENCOUNTER NOTE
Dear Marbella    Your labs are normal for fasting glucose , no Diabetes - that's good  Normal kidney functions and electrolytes .  Fasting lipid is good for HDL that protects heart  But the total and bad cholesterol still high.  I know you stay active and eat healthy.  I think at this point keep that up.  Over the counter red yeast rice, maybe a good choice to see if bad cholesterol lowers.  And we should repeat that next year.  Vitamin D level looks good.    Please keep us posted with questions or concerns .      Best Regards,    Nancy Marie MD  [facility]  [phone]

## 2020-07-24 NOTE — NURSING NOTE
"Chief Complaint   Patient presents with     Medicare Visit     /76   Pulse 52   Temp 98.4  F (36.9  C) (Oral)   Ht 1.684 m (5' 6.3\")   Wt 64.5 kg (142 lb 3.2 oz)   SpO2 96%   BMI 22.74 kg/m   Estimated body mass index is 22.74 kg/m  as calculated from the following:    Height as of this encounter: 1.684 m (5' 6.3\").    Weight as of this encounter: 64.5 kg (142 lb 3.2 oz).  bp completed using cuff size: regular      Health Maintenance addressed:  NONE    n/a    Sana Crooks MA    "

## 2020-07-24 NOTE — PROGRESS NOTES
"SUBJECTIVE:   Marbella Welsh is a 65 year old female who presents for Preventive Visit.      Are you in the first 12 months of your Medicare coverage?  No    Healthy Habits:    In general, how would you rate your overall health?  Excellent    Frequency of exercise:  6-7 days/week    Duration of exercise:  30-45 minutes    Do you usually eat at least 4 servings of fruit and vegetables a day, include whole grains    & fiber and avoid regularly eating high fat or \"junk\" foods?  Yes    Taking medications regularly:  Yes    Barriers to taking medications:  None    Medication side effects:  None    Ability to successfully perform activities of daily living:  No assistance needed    Home Safety:  No safety concerns identified    Hearing Impairment:  No hearing concerns    In the past 6 months, have you been bothered by leaking of urine?  No    In general, how would you rate your overall mental or emotional health?  Very good      PHQ-2 Total Score:    Additional concerns today:  No    Do you feel safe in your environment? Yes    Have you ever done Advance Care Planning? (For example, a Health Directive, POLST, or a discussion with a medical provider or your loved ones about your wishes): Yes, patient states has an Advance Care Planning document and will bring a copy to the clinic.      Fall risk  Fallen 2 or more times in the past year?: No  Any fall with injury in the past year?: No    Cognitive Screening   1) Repeat 3 items (Leader, Season, Table)    2) Clock draw: NORMAL  3) 3 item recall: Recalls 3 objects  Results: 3 items recalled: COGNITIVE IMPAIRMENT LESS LIKELY    Mini-CogTM Copyright NAY Hurtado. Licensed by the author for use in [facility]; reprinted with permission (deo@.[facility]). All rights reserved.       Do you have sleep apnea, excessive snoring or daytime drowsiness?: no    Reviewed and updated as needed this visit by clinical staff  Tobacco  Allergies  Meds         Reviewed and updated as " needed this visit by Provider        Social History     Tobacco Use     Smoking status: Former Smoker     Types: Cigarettes     Last attempt to quit: 1978     Years since quittin.5     Smokeless tobacco: Never Used   Substance Use Topics     Alcohol use: Yes     Comment: 2 beers weekly     If you drink alcohol do you typically have >3 drinks per day or >7 drinks per week? No    No flowsheet data found.        PROBLEMS TO ADD ON...    Current providers sharing in care for this patient include:   Patient Care Team:  Nancy Marie MD as PCP - General (Family Practice)  Nancy Marie MD as Assigned PCP    The following health maintenance items are reviewed in Epic and correct as of today:  Health Maintenance   Topic Date Due     ZOSTER IMMUNIZATION (2 of 3) 2013     ADVANCE CARE PLANNING  2018     PHQ-2  2020     MEDICARE ANNUAL WELLNESS VISIT  2020     FALL RISK ASSESSMENT  2020     PNEUMOCOCCAL IMMUNIZATION 65+ LOW/MEDIUM RISK (2 of 2 - PPSV23) 2020     INFLUENZA VACCINE (1) 2020     COLORECTAL CANCER SCREENING  2021     MAMMO SCREENING  2021     LIPID  10/24/2022     DTAP/TDAP/TD IMMUNIZATION (2 - Td) 2023     DEXA  Completed     HEPATITIS C SCREENING  Completed     IPV IMMUNIZATION  Aged Out     MENINGITIS IMMUNIZATION  Aged Out     HEPATITIS B IMMUNIZATION  Aged Out     BP Readings from Last 3 Encounters:   20 134/76   20 (!) 136/90   19 117/48    Wt Readings from Last 3 Encounters:   20 64.5 kg (142 lb 3.2 oz)   20 67.3 kg (148 lb 4.8 oz)   19 65.8 kg (145 lb)                  Pneumonia Vaccine:Adults age 65+ who received Pneumovax (PPSV23) at 65 years or older: Should be given PCV13 > 1 year after their most recent PPSV23 Adults age 65+ who received their first dose of Pneumovax (PPSV23) prior to age 65 years: Should be given PCV 13 > 1 year after their most recent PPSV23 AND should be given a  "another dose of PPSV23 > 5 years after their most recent dose of PPSV23 Adults age 65+ who have not received previous Pneumovax (PPSV23) or PCV13 as an adult: Should first be given PCV13 AND then should be given PPSV23 6-12 months after PCV13  Last 3 Pap and HPV Results:      Review of Systems  Constitutional, HEENT, cardiovascular, pulmonary, GI, , musculoskeletal, neuro, skin, endocrine and psych systems are negative, except as otherwise noted.    OBJECTIVE:   /76   Pulse 52   Temp 98.4  F (36.9  C) (Oral)   Ht 1.684 m (5' 6.3\")   Wt 64.5 kg (142 lb 3.2 oz)   SpO2 96%   BMI 22.74 kg/m   Estimated body mass index is 22.74 kg/m  as calculated from the following:    Height as of this encounter: 1.684 m (5' 6.3\").    Weight as of this encounter: 64.5 kg (142 lb 3.2 oz).  Physical Exam  GENERAL: healthy, alert and no distress  EYES: Eyes grossly normal to inspection, PERRL and conjunctivae and sclerae normal  HENT: ear canals and TM's normal, nose and mouth without ulcers or lesions  NECK: no adenopathy, no asymmetry, masses, or scars and thyroid normal to palpation  RESP: lungs clear to auscultation - no rales, rhonchi or wheezes  BREAST: normal without masses, tenderness or nipple discharge and no palpable axillary masses or adenopathy  CV: regular rate and rhythm, normal S1 S2, no S3 or S4, no murmur, click or rub, no peripheral edema and peripheral pulses strong  ABDOMEN: soft, nontender, no hepatosplenomegaly, no masses and bowel sounds normal  MS: no gross musculoskeletal defects noted, no edema  SKIN: no suspicious lesions or rashes  NEURO: Normal strength and tone, mentation intact and speech normal  PSYCH: mentation appears normal, affect normal/bright    Diagnostic Test Results:  Labs reviewed in Epic  none     ASSESSMENT / PLAN:       ICD-10-CM    1. Encounter for Medicare annual wellness exam  Z00.00 Lipid panel reflex to direct LDL Fasting   2. Age-related osteoporosis without current " "pathological fracture  M81.0 alendronate (FOSAMAX) 70 MG tablet     DX Hip/Pelvis/Spine     Basic metabolic panel  (Ca, Cl, CO2, Creat, Gluc, K, Na, BUN)     Vitamin D Deficiency       COUNSELING:  Reviewed preventive health counseling, as reflected in patient instructions       Regular exercise       Healthy diet/nutrition       Fall risk prevention       Immunizations    Vaccinated for: Pneumococcal             Advanced Planning     Estimated body mass index is 22.74 kg/m  as calculated from the following:    Height as of this encounter: 1.684 m (5' 6.3\").    Weight as of this encounter: 64.5 kg (142 lb 3.2 oz).         reports that she quit smoking about 42 years ago. Her smoking use included cigarettes. She has never used smokeless tobacco.      Appropriate preventive services were discussed with this patient, including applicable screening as appropriate for cardiovascular disease, diabetes, osteopenia/osteoporosis, and glaucoma.  As appropriate for age/gender, discussed screening for colorectal cancer, prostate cancer, breast cancer, and cervical cancer. Checklist reviewing preventive services available has been given to the patient.    Reviewed patients plan of care and provided an AVS. The Basic Care Plan (routine screening as documented in Health Maintenance) for Marbella meets the Care Plan requirement. This Care Plan has been established and reviewed with the Patient.    Counseling Resources:  ATP IV Guidelines  Pooled Cohorts Equation Calculator  Breast Cancer Risk Calculator  FRAX Risk Assessment  ICSI Preventive Guidelines  Dietary Guidelines for Americans, 2010  USDA's MyPlate  ASA Prophylaxis  Lung CA Screening    Nancy Marie MD  [facility]    Identified Health Risks:  "

## 2020-07-24 NOTE — PATIENT INSTRUCTIONS
shingrix vaccine   Patient Education   Personalized Prevention Plan  You are due for the preventive services outlined below.  Your care team is available to assist you in scheduling these services.  If you have already completed any of these items, please share that information with your care team to update in your medical record.  Health Maintenance Due   Topic Date Due     Zoster (Shingles) Vaccine (2 of 3) 12/02/2013     Discuss Advance Care Planning  06/05/2018     PHQ-2  01/01/2020     Annual Wellness Visit  06/04/2020     FALL RISK ASSESSMENT  06/04/2020     Pneumococcal Vaccine (2 of 2 - PPSV23) 06/04/2020        Patient Education   Personalized Prevention Plan  You are due for the preventive services outlined below.  Your care team is available to assist you in scheduling these services.  If you have already completed any of these items, please share that information with your care team to update in your medical record.  Health Maintenance Due   Topic Date Due     Zoster (Shingles) Vaccine (2 of 3) 12/02/2013     Discuss Advance Care Planning  06/05/2018     PHQ-2  01/01/2020     Annual Wellness Visit  06/04/2020     FALL RISK ASSESSMENT  06/04/2020     Pneumococcal Vaccine (2 of 2 - PPSV23) 06/04/2020

## 2020-07-26 LAB — DEPRECATED CALCIDIOL+CALCIFEROL SERPL-MC: 49 UG/L (ref 20–75)

## 2020-11-21 ENCOUNTER — MYC MEDICAL ADVICE (OUTPATIENT)
Dept: FAMILY MEDICINE | Facility: CLINIC | Age: 65
End: 2020-11-21

## 2020-12-11 ENCOUNTER — TRANSFERRED RECORDS (OUTPATIENT)
Dept: HEALTH INFORMATION MANAGEMENT | Facility: CLINIC | Age: 65
End: 2020-12-11

## 2021-02-17 ENCOUNTER — MYC MEDICAL ADVICE (OUTPATIENT)
Dept: FAMILY MEDICINE | Facility: CLINIC | Age: 66
End: 2021-02-17

## 2021-04-29 ENCOUNTER — HOSPITAL ENCOUNTER (OUTPATIENT)
Dept: BONE DENSITY | Facility: CLINIC | Age: 66
Discharge: HOME | End: 2021-04-29
Attending: FAMILY MEDICINE | Admitting: FAMILY MEDICINE
Payer: COMMERCIAL

## 2021-04-29 DIAGNOSIS — M81.0 AGE-RELATED OSTEOPOROSIS WITHOUT CURRENT PATHOLOGICAL FRACTURE: ICD-10-CM

## 2021-04-29 PROCEDURE — 77080 DXA BONE DENSITY AXIAL: CPT

## 2021-05-13 ENCOUNTER — MYC MEDICAL ADVICE (OUTPATIENT)
Dept: FAMILY MEDICINE | Facility: CLINIC | Age: 66
End: 2021-05-13

## 2021-07-01 ENCOUNTER — MYC MEDICAL ADVICE (OUTPATIENT)
Dept: FAMILY MEDICINE | Facility: CLINIC | Age: 66
End: 2021-07-01

## 2021-07-01 DIAGNOSIS — Z12.11 COLON CANCER SCREENING: Primary | ICD-10-CM

## 2021-07-05 ENCOUNTER — TELEPHONE (OUTPATIENT)
Dept: GASTROENTEROLOGY | Facility: CLINIC | Age: 66
End: 2021-07-05

## 2021-07-05 DIAGNOSIS — Z11.59 ENCOUNTER FOR SCREENING FOR OTHER VIRAL DISEASES: ICD-10-CM

## 2021-07-05 NOTE — TELEPHONE ENCOUNTER
Screening Questions  1. Are you active on mychart? YES    2. What insurance is in the chart? BCBS (MEDICARE)     2.  Ordering/Referring Provider: Nancy Marie MD in [facility]    3. BMI 21.9 --> 5'7 140#    4. Are you on daily home oxygen? NO    5. Do you have a history of difficult airway? NO    6. Have you had a heart, lung, or liver transplant?  NO    7. Are you currently on dialysis? NO    8. Have you had a stroke or Transient ischemic atttack (TIA) within 6 months? NO    9. In the past 6 months, have you had any heart related issues including cardiomyopathy or heart attack?         If yes, did it require cardiac stenting or other implantable device?NO    10. Do you have any implantable devices in your body (pacemaker, defib, LVAD)? NO     11. Do you take nitroglycerin? If yes, how often? NO    12. Are you currently taking any blood thinners? NO     13. Are you a diabetic? NO    14. (Females) Are you currently pregnant? NO  If yes, how many weeks?    15. Have you had a procedure in the past that was difficult to tolerate with conscious sedation? Any allergies to Fentanyl or Versed NO    16. Are you taking any scheduled prescription narcotics more than once daily? NO    17. Do you have any chemical dependencies such as alcohol, street drugs, or methadone? NO    18. Do you have any history of post-traumatic stress syndrome or mental health issues? NO    19. Do you transfer independently? YES    20.  Do you have any issues with constipation? NO    21. Preferred Pharmacy for Pre Prescription [facility]- [facility] [place] ( [address])     Scheduling Details    Procedure Scheduled: COLONOSCOPY  Provider/Surgeon: DR. LEYVA  Date of Procedure: 07/26/2021  Location: [facility]  Caller (Please ask for phone number if not scheduled by patient): PATIENT      Sedation Type: C. SEDATION  Conscious Sedation- Needs  for 6 hours after the procedure  MAC/General-Needs  for 24 hours after  procedure    Pre-op Required at [facility], and OR for MAC sedation:   (if yes advise patient they will need a pre-op prior to procedure)      Is patient on blood thinners? -NO (If yes- inform patient to follow up with PCP or provider for follow up instructions)     Informed patient they will need an adult  YES  Cannot take any type of public or medical transportation alone    Informed Patient of COVID Test Requirement YES- SCHEDULED    Confirmed Nurse will call to complete assessment YES    Additional comments:

## 2021-07-30 ENCOUNTER — LAB (OUTPATIENT)
Dept: LAB | Facility: CLINIC | Age: 66
End: 2021-07-30
Payer: COMMERCIAL

## 2021-07-30 DIAGNOSIS — Z11.59 ENCOUNTER FOR SCREENING FOR OTHER VIRAL DISEASES: ICD-10-CM

## 2021-07-30 LAB — SARS-COV-2 RNA RESP QL NAA+PROBE: NEGATIVE

## 2021-07-30 PROCEDURE — U0003 INFECTIOUS AGENT DETECTION BY NUCLEIC ACID (DNA OR RNA); SEVERE ACUTE RESPIRATORY SYNDROME CORONAVIRUS 2 (SARS-COV-2) (CORONAVIRUS DISEASE [COVID-19]), AMPLIFIED PROBE TECHNIQUE, MAKING USE OF HIGH THROUGHPUT TECHNOLOGIES AS DESCRIBED BY CMS-2020-01-R: HCPCS

## 2021-07-30 PROCEDURE — U0005 INFEC AGEN DETEC AMPLI PROBE: HCPCS

## 2021-08-02 ENCOUNTER — HOSPITAL ENCOUNTER (OUTPATIENT)
Facility: CLINIC | Age: 66
Discharge: HOME | End: 2021-08-02
Attending: COLON & RECTAL SURGERY | Admitting: COLON & RECTAL SURGERY
Payer: COMMERCIAL

## 2021-08-02 VITALS
HEIGHT: 67 IN | BODY MASS INDEX: 21.97 KG/M2 | RESPIRATION RATE: 21 BRPM | OXYGEN SATURATION: 95 % | SYSTOLIC BLOOD PRESSURE: 99 MMHG | WEIGHT: 140 LBS | HEART RATE: 48 BPM | DIASTOLIC BLOOD PRESSURE: 59 MMHG

## 2021-08-02 LAB — COLONOSCOPY: NORMAL

## 2021-08-02 PROCEDURE — 250N000011 HC RX IP 250 OP 636: Performed by: COLON & RECTAL SURGERY

## 2021-08-02 PROCEDURE — G0500 MOD SEDAT ENDO SERVICE >5YRS: HCPCS | Performed by: COLON & RECTAL SURGERY

## 2021-08-02 PROCEDURE — G0121 COLON CA SCRN NOT HI RSK IND: HCPCS | Performed by: COLON & RECTAL SURGERY

## 2021-08-02 PROCEDURE — 45378 DIAGNOSTIC COLONOSCOPY: CPT | Performed by: COLON & RECTAL SURGERY

## 2021-08-02 RX ORDER — LIDOCAINE 40 MG/G
CREAM TOPICAL
Status: DISCONTINUED | OUTPATIENT
Start: 2021-08-02 | End: 2021-08-04 | Stop reason: HOSPADM

## 2021-08-02 RX ORDER — ONDANSETRON 2 MG/ML
4 INJECTION INTRAMUSCULAR; INTRAVENOUS
Status: DISCONTINUED | OUTPATIENT
Start: 2021-08-02 | End: 2021-08-04 | Stop reason: HOSPADM

## 2021-08-02 RX ORDER — FENTANYL CITRATE 50 UG/ML
INJECTION, SOLUTION INTRAMUSCULAR; INTRAVENOUS PRN
Status: DISCONTINUED | OUTPATIENT
Start: 2021-08-02 | End: 2021-08-04 | Stop reason: HOSPADM

## 2021-08-02 RX ADMIN — MIDAZOLAM 2 MG: 1 INJECTION INTRAMUSCULAR; INTRAVENOUS at 11:35

## 2021-08-02 RX ADMIN — FENTANYL CITRATE 100 MCG: 50 INJECTION, SOLUTION INTRAMUSCULAR; INTRAVENOUS at 11:34

## 2021-08-02 ASSESSMENT — MIFFLIN-ST. JEOR: SCORE: 1202.67

## 2021-08-02 NOTE — H&P
Colon & Rectal Surgery History and Physical  Pre-Endoscopy Procedure Note    History of Present Illness   I have been asked by Dr. Marie to evaluate this 66 year old female for colorectal cancer screening. She had a normal screening colonoscopy in 2011 and currently denies any abdominal pain, weight loss, bleeding per rectum, or recent change in bowel habits.    Past Medical History  Diagnosis Date     Diverticulosis, sigmoid 2011     Nasal congestion      Tinnitus 7/23/2013       Past Surgical History  Procedure Laterality Date     [place] wrist       RAD RESEC TONSIL/PILLARS       TOTAL ABDOM HYSTERECTOMY  2000     TOE SURGERY          Medications  Medication Sig     alendronate (FOSAMAX) 70 MG tablet TAKE 1 TAB BY MOUTH WITH 8OZ H2O EVERY 7 DAYS 30 MINS BEFORE BREAKFAST. REMAIN UPRIGHT.     VITAMIN D, CHOLECALCIFEROL, PO Take 1,000 Units by mouth daily     MAGNESIUM OXIDE PO        Allergies  Allergen Reactions     NKDA [No Known Drug Allergies]         Family History   Family history is not contributory     Social History   She reports that she quit smoking about 43 years ago. Her smoking use included cigarettes. She has never used smokeless tobacco. She reports current alcohol use. She reports that she does not use drugs.    Review of Systems   Constitutional:  No fever, weight change or fatigue.    Eyes:     No dry eyes or vision changes.   Ears/Nose/Throat/Neck:  No oral ulcers, sore throat or voice change.    Cardiovascular:   No palpitations, syncope, angina or edema.   Respiratory:    No chest pain, excessive sleepiness, shortness of breath or hemoptysis.    Gastrointestinal:   No abdominal pain, nausea, vomiting, diarrhea or heartburn.    Genitourinary:   No dysuria, hematuria, urinary retention or urinary frequency.   Musculoskeletal:  No joint swelling or arthralgias.    Dermatologic:  No skin rash or other skin changes.   Neurologic:    No focal weakness or numbness. No neuropathy.   Psychiatric:  "   No depression, anxiety, suicidal ideation, or paranoid ideation.   Endocrine:   No cold or heat intolerance, polydipsia, hirsutism, change in libido, or flushing.   Hematology/Lymphatic:  No bleeding or lymphadenopathy.    Allergy/Immunology:  No rhinitis or hives.     Physical Exam   Vitals:  /66, RR 16, HR 64, height 1.702 m (5' 7\"), weight 63.5 kg (140 lb), SpO2 99 %, not currently breastfeeding.    General:  Alert and oriented to person, place and time   Airway: Normal oropharyngeal airway and neck mobility   Lungs:  Clear bilaterally   Heart:  Regular rate and rhythm   Abdomen: Soft, NT, ND, no masses   Extremities: Warm, good capillary refill    ASA Grade: I (normal healthy patient)      Impression: Cleared for use of conscious sedation for colorectal cancer screening    Plan: Proceed with colonoscopy     Genevieve Larson MD  [facility]  [phone]  "

## 2021-08-29 ENCOUNTER — HEALTH MAINTENANCE LETTER (OUTPATIENT)
Age: 66
End: 2021-08-29

## 2021-09-09 ASSESSMENT — ENCOUNTER SYMPTOMS
ARTHRALGIAS: 0
CHILLS: 0
BREAST MASS: 0
DIARRHEA: 0
COUGH: 0
CONSTIPATION: 0
FEVER: 0
MYALGIAS: 0
DIZZINESS: 0
FREQUENCY: 0
ABDOMINAL PAIN: 0
HEMATOCHEZIA: 0
PARESTHESIAS: 0
NERVOUS/ANXIOUS: 1
JOINT SWELLING: 0
SORE THROAT: 0
WEAKNESS: 0
HEMATURIA: 0
PALPITATIONS: 0
SHORTNESS OF BREATH: 0
HEADACHES: 0
EYE PAIN: 0
NAUSEA: 0
HEARTBURN: 0
DYSURIA: 0

## 2021-09-09 ASSESSMENT — ACTIVITIES OF DAILY LIVING (ADL): CURRENT_FUNCTION: NO ASSISTANCE NEEDED

## 2021-09-10 NOTE — PROGRESS NOTES
"SUBJECTIVE:   Marbella Welsh is a 66 year old female who presents for Preventive Visit.      Patient has been advised of split billing requirements and indicates understanding: Yes   Are you in the first 12 months of your Medicare coverage?  No    Healthy Habits:     In general, how would you rate your overall health?  Excellent    Frequency of exercise:  6-7 days/week    Duration of exercise:  Greater than 60 minutes    Do you usually eat at least 4 servings of fruit and vegetables a day, include whole grains    & fiber and avoid regularly eating high fat or \"junk\" foods?  Yes    Taking medications regularly:  Yes    Medication side effects:  None    Ability to successfully perform activities of daily living:  No assistance needed    Home Safety:  No safety concerns identified    Hearing Impairment:  No hearing concerns    In the past 6 months, have you been bothered by leaking of urine?  No    In general, how would you rate your overall mental or emotional health?  Good      PHQ-2 Total Score: 2    Additional concerns today:  No    Do you feel safe in your environment? Yes    Have you ever done Advance Care Planning? (For example, a Health Directive, POLST, or a discussion with a medical provider or your loved ones about your wishes): Yes, patient states has an Advance Care Planning document and will bring a copy to the clinic.       Fall risk  Fallen 2 or more times in the past year?: No  Any fall with injury in the past year?: No    Cognitive Screening   1) Repeat 3 items (Leader, Season, Table)    2) Clock draw: NORMAL  3) 3 item recall: Recalls 3 objects  Results: 3 items recalled: COGNITIVE IMPAIRMENT LESS LIKELY    Mini-CogTM Copyright NAY Hurtado. Licensed by the author for use in [facility]; reprinted with permission (deo@.[email or web address]). All rights reserved.      Do you have sleep apnea, excessive snoring or daytime drowsiness?: no    Reviewed and updated as needed this visit by clinical staff  Tobacco "  Allergies  Meds  Problems  Med Hx  Surg Hx  Fam Hx  Soc Hx          Reviewed and updated as needed this visit by Provider  Tobacco  Allergies  Meds  Problems  Med Hx  Surg Hx  Fam Hx  Soc Hx         Social History     Tobacco Use     Smoking status: Former Smoker     Packs/day: 0.50     Years: 10.00     Pack years: 5.00     Types: Cigarettes     Start date: 1968     Quit date: 1978     Years since quittin.6     Smokeless tobacco: Never Used     Tobacco comment: I have not smoked since quitting in    Substance Use Topics     Alcohol use: Yes     Comment: 1 light beer ~5 days per week     If you drink alcohol do you typically have >3 drinks per day or >7 drinks per week? No    No flowsheet data found.            Current providers sharing in care for this patient include:   Patient Care Team:  Nancy Marie MD as PCP - General (Family Practice)  Nancy Marie MD as Assigned PCP    The following health maintenance items are reviewed in Epic and correct as of today:  Health Maintenance Due   Topic Date Due     ANNUAL REVIEW OF HM ORDERS  Never done     FALL RISK ASSESSMENT  2021     INFLUENZA VACCINE (1) 2021     BP Readings from Last 3 Encounters:   09/15/21 115/69   21 99/59   20 134/76    Wt Readings from Last 3 Encounters:   09/15/21 63.7 kg (140 lb 8 oz)   21 63.5 kg (140 lb)   20 64.5 kg (142 lb 3.2 oz)                      Breast CA Risk Assessment (FHS-7) 2021   Do you have a family history of breast, colon, or ovarian cancer? No / Unknown         Review of Systems   Constitutional: Negative for chills and fever.   HENT: Negative for congestion, ear pain, hearing loss and sore throat.    Eyes: Negative for pain and visual disturbance.   Respiratory: Negative for cough and shortness of breath.    Cardiovascular: Negative for chest pain, palpitations and peripheral edema.   Gastrointestinal: Negative for abdominal pain,  "constipation, diarrhea, heartburn, hematochezia and nausea.   Breasts:  Negative for tenderness, breast mass and discharge.   Genitourinary: Negative for dysuria, frequency, genital sores, hematuria, pelvic pain, urgency, vaginal bleeding and vaginal discharge.   Musculoskeletal: Negative for arthralgias, joint swelling and myalgias.   Skin: Negative for rash.   Neurological: Negative for dizziness, weakness, headaches and paresthesias.   Psychiatric/Behavioral: Positive for mood changes. The patient is nervous/anxious.      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 And duee to covid in 2020- had isolatin- does not think she is depressed but feels emotional     OBJECTIVE:   /69   Pulse 56   Temp 97.9  F (36.6  C)   Ht 1.702 m (5' 7\")   Wt 63.7 kg (140 lb 8 oz)   SpO2 97%   BMI 22.01 kg/m   Estimated body mass index is 22.01 kg/m  as calculated from the following:    Height as of this encounter: 1.702 m (5' 7\").    Weight as of this encounter: 63.7 kg (140 lb 8 oz).  Physical Exam  GENERAL APPEARANCE: healthy, alert and no distress  EYES: Eyes grossly normal to inspection, PERRL and conjunctivae and sclerae normal  HENT: ear canals and TM's normal, nose and mouth without ulcers or lesions, oropharynx clear and oral mucous membranes moist  NECK: no adenopathy, no asymmetry, masses, or scars and thyroid normal to palpation  RESP: lungs clear to auscultation - no rales, rhonchi or wheezes  BREAST: normal without masses, tenderness or nipple discharge and no palpable axillary masses or adenopathy  CV: regular rate and rhythm, normal S1 S2, no S3 or S4, no murmur, click or rub, no peripheral edema and peripheral pulses strong  ABDOMEN: soft, nontender, no hepatosplenomegaly, no masses and bowel sounds normal  MS: no musculoskeletal defects are noted and gait is age appropriate without ataxia  SKIN: no suspicious lesions or rashes  NEURO: Normal strength and tone, sensory exam grossly normal, mentation intact and speech " "normal  PSYCH: mentation appears normal and affect normal/bright    Diagnostic Test Results:  Labs reviewed in Epic    ASSESSMENT / PLAN:   Marbella was seen today for physical.    Diagnoses and all orders for this visit:    Routine general medical examination at a health care facility  Comments:  colonosocopy up to date, & [id number]/ 2021, next in 10 yrs 4/2031  mammogram annuallly up to date- next due 12/21.  Dexa 04/2021    Hyperlipidemia LDL goal <160  -     Lipid Profile (Chol, Trig, HDL, LDL calc)    Age-related osteoporosis without current pathological fracture  Comments:  started 06/2018- advised to cntinue till 06/2023.  Orders:  -     alendronate (FOSAMAX) 70 MG tablet; TAKE 1 TAB BY MOUTH WITH 8OZ H2O EVERY 7 DAYS 30 MINS BEFORE BREAKFAST. REMAIN UPRIGHT.    Encounter for screening mammogram for malignant neoplasm of breast  -     MA Screen Bilateral w/Stephan; Future    Need for vaccination    Other orders  -     INFLUENZA, QUAD, HIGH DOSE, PF, 65YR + (FLUZONE HD)        Patient has been advised of split billing requirements and indicates understanding: Yes  COUNSELING:  Reviewed preventive health counseling, as reflected in patient instructions       Regular exercise       Healthy diet/nutrition       Vision screening       Hearing screening       Dental care       Bladder control       Fall risk prevention       Osteoporosis prevention/bone health       Advanced Planning     Estimated body mass index is 22.01 kg/m  as calculated from the following:    Height as of this encounter: 1.702 m (5' 7\").    Weight as of this encounter: 63.7 kg (140 lb 8 oz).        She reports that she quit smoking about 43 years ago. Her smoking use included cigarettes. She started smoking about 53 years ago. She has a 5.00 pack-year smoking history. She has never used smokeless tobacco.      Appropriate preventive services were discussed with this patient, including applicable screening as appropriate for cardiovascular disease, " diabetes, osteopenia/osteoporosis, and glaucoma.  As appropriate for age/gender, discussed screening for colorectal cancer, prostate cancer, breast cancer, and cervical cancer. Checklist reviewing preventive services available has been given to the patient.    Reviewed patients plan of care and provided an AVS. The Basic Care Plan (routine screening as documented in Health Maintenance) for Marbella meets the Care Plan requirement. This Care Plan has been established and reviewed with the Patient.    Counseling Resources:  ATP IV Guidelines  Pooled Cohorts Equation Calculator  Breast Cancer Risk Calculator  Breast Cancer: Medication to Reduce Risk  FRAX Risk Assessment  ICSI Preventive Guidelines  Dietary Guidelines for Americans, 2010  USDA's MyPlate  ASA Prophylaxis  Lung CA Screening    Nancy Marie MD  [facility]    Identified Health Risks:

## 2021-09-15 ENCOUNTER — OFFICE VISIT (OUTPATIENT)
Dept: FAMILY MEDICINE | Facility: CLINIC | Age: 66
End: 2021-09-15
Payer: COMMERCIAL

## 2021-09-15 VITALS
HEART RATE: 56 BPM | SYSTOLIC BLOOD PRESSURE: 115 MMHG | TEMPERATURE: 97.9 F | OXYGEN SATURATION: 97 % | WEIGHT: 140.5 LBS | HEIGHT: 67 IN | BODY MASS INDEX: 22.05 KG/M2 | DIASTOLIC BLOOD PRESSURE: 69 MMHG

## 2021-09-15 DIAGNOSIS — M81.0 AGE-RELATED OSTEOPOROSIS WITHOUT CURRENT PATHOLOGICAL FRACTURE: ICD-10-CM

## 2021-09-15 DIAGNOSIS — Z23 NEED FOR VACCINATION: ICD-10-CM

## 2021-09-15 DIAGNOSIS — Z00.00 ROUTINE GENERAL MEDICAL EXAMINATION AT A HEALTH CARE FACILITY: Primary | ICD-10-CM

## 2021-09-15 DIAGNOSIS — Z12.31 ENCOUNTER FOR SCREENING MAMMOGRAM FOR MALIGNANT NEOPLASM OF BREAST: ICD-10-CM

## 2021-09-15 DIAGNOSIS — E78.5 HYPERLIPIDEMIA LDL GOAL <160: ICD-10-CM

## 2021-09-15 PROCEDURE — G0438 PPPS, INITIAL VISIT: HCPCS | Performed by: FAMILY MEDICINE

## 2021-09-15 PROCEDURE — G0008 ADMIN INFLUENZA VIRUS VAC: HCPCS | Performed by: FAMILY MEDICINE

## 2021-09-15 PROCEDURE — 80061 LIPID PANEL: CPT | Performed by: FAMILY MEDICINE

## 2021-09-15 PROCEDURE — 90662 IIV NO PRSV INCREASED AG IM: CPT | Performed by: FAMILY MEDICINE

## 2021-09-15 PROCEDURE — 36415 COLL VENOUS BLD VENIPUNCTURE: CPT | Performed by: FAMILY MEDICINE

## 2021-09-15 RX ORDER — ALENDRONATE SODIUM 70 MG/1
TABLET ORAL
Qty: 12 TABLET | Refills: 4 | Status: SHIPPED | OUTPATIENT
Start: 2021-09-15 | End: 2022-08-10

## 2021-09-15 ASSESSMENT — ENCOUNTER SYMPTOMS
SHORTNESS OF BREATH: 0
JOINT SWELLING: 0
FREQUENCY: 0
HEARTBURN: 0
CONSTIPATION: 0
HEADACHES: 0
CHILLS: 0
ARTHRALGIAS: 0
BREAST MASS: 0
SORE THROAT: 0
HEMATURIA: 0
DIZZINESS: 0
DYSURIA: 0
COUGH: 0
ABDOMINAL PAIN: 0
PALPITATIONS: 0
PARESTHESIAS: 0
WEAKNESS: 0
EYE PAIN: 0
DIARRHEA: 0
MYALGIAS: 0
NAUSEA: 0
FEVER: 0
HEMATOCHEZIA: 0
NERVOUS/ANXIOUS: 1

## 2021-09-15 ASSESSMENT — MIFFLIN-ST. JEOR: SCORE: 1204.93

## 2021-09-15 ASSESSMENT — ACTIVITIES OF DAILY LIVING (ADL): CURRENT_FUNCTION: NO ASSISTANCE NEEDED

## 2021-09-15 NOTE — PATIENT INSTRUCTIONS
Patient Education   Personalized Prevention Plan  You are due for the preventive services outlined below.  Your care team is available to assist you in scheduling these services.  If you have already completed any of these items, please share that information with your care team to update in your medical record.  Health Maintenance Due   Topic Date Due     ANNUAL REVIEW OF HM ORDERS  Never done     FALL RISK ASSESSMENT  07/24/2021     Flu Vaccine (1) 09/01/2021

## 2021-09-16 LAB
CHOLEST SERPL-MCNC: 235 MG/DL
FASTING STATUS PATIENT QL REPORTED: YES
HDLC SERPL-MCNC: 76 MG/DL
LDLC SERPL CALC-MCNC: 144 MG/DL
NONHDLC SERPL-MCNC: 159 MG/DL
TRIGL SERPL-MCNC: 74 MG/DL

## 2021-09-16 NOTE — RESULT ENCOUNTER NOTE
Hi      Lipid about same high as last year.  Over the counter red yeast rice - may helped, along with diet and excercise          -LDL(bad) cholesterol level is elevated, and your triglycerides are elevated which can increase your heart disease risk.  A diet high in fat and simple carbohydrates, genetics and being overweight can contribute to this. ADVISE: exercising 150 minutes of aerobic exercise per week (30 minutes for 5 days per week or 50 minutes for 3 days per week are options), eating a low saturated fat/low carbohydrate diet, and omega-3 fatty acids (fish oil) 9751-2507 mg daily are helpful to improve this. In 12 months, you should recheck your fasting cholesterol panel by scheduling a lab-only appointment.    Please keep us posted with questions or concerns .      Best Regards,    Nancy Marie MD  [facility]  [phone]

## 2021-11-03 ENCOUNTER — MYC MEDICAL ADVICE (OUTPATIENT)
Dept: FAMILY MEDICINE | Facility: CLINIC | Age: 66
End: 2021-11-03

## 2021-11-03 DIAGNOSIS — E78.5 HYPERLIPIDEMIA LDL GOAL <160: Primary | ICD-10-CM

## 2022-01-04 ENCOUNTER — TRANSFERRED RECORDS (OUTPATIENT)
Dept: HEALTH INFORMATION MANAGEMENT | Facility: CLINIC | Age: 67
End: 2022-01-04
Payer: COMMERCIAL

## 2022-01-06 ENCOUNTER — TRANSFERRED RECORDS (OUTPATIENT)
Dept: HEALTH INFORMATION MANAGEMENT | Facility: CLINIC | Age: 67
End: 2022-01-06
Payer: COMMERCIAL

## 2022-06-29 ENCOUNTER — MYC MEDICAL ADVICE (OUTPATIENT)
Dept: FAMILY MEDICINE | Facility: CLINIC | Age: 67
End: 2022-06-29

## 2022-08-10 ENCOUNTER — OFFICE VISIT (OUTPATIENT)
Dept: FAMILY MEDICINE | Facility: CLINIC | Age: 67
End: 2022-08-10
Payer: COMMERCIAL

## 2022-08-10 VITALS
RESPIRATION RATE: 16 BRPM | SYSTOLIC BLOOD PRESSURE: 125 MMHG | HEART RATE: 53 BPM | TEMPERATURE: 97.3 F | OXYGEN SATURATION: 97 % | DIASTOLIC BLOOD PRESSURE: 57 MMHG | WEIGHT: 140.7 LBS | BODY MASS INDEX: 22.61 KG/M2 | HEIGHT: 66 IN

## 2022-08-10 DIAGNOSIS — E78.5 HYPERLIPIDEMIA LDL GOAL <160: ICD-10-CM

## 2022-08-10 DIAGNOSIS — D22.9 NUMEROUS MOLES: ICD-10-CM

## 2022-08-10 DIAGNOSIS — Z00.00 ROUTINE GENERAL MEDICAL EXAMINATION AT A HEALTH CARE FACILITY: Primary | ICD-10-CM

## 2022-08-10 DIAGNOSIS — H61.21 IMPACTED CERUMEN OF RIGHT EAR: ICD-10-CM

## 2022-08-10 DIAGNOSIS — L82.1 SEBORRHEIC KERATOSES: ICD-10-CM

## 2022-08-10 DIAGNOSIS — F43.21 GRIEVING: ICD-10-CM

## 2022-08-10 DIAGNOSIS — M81.0 AGE-RELATED OSTEOPOROSIS WITHOUT CURRENT PATHOLOGICAL FRACTURE: ICD-10-CM

## 2022-08-10 DIAGNOSIS — Z12.31 ENCOUNTER FOR SCREENING MAMMOGRAM FOR MALIGNANT NEOPLASM OF BREAST: ICD-10-CM

## 2022-08-10 LAB
CHOLEST SERPL-MCNC: 183 MG/DL
FASTING STATUS PATIENT QL REPORTED: YES
FASTING STATUS PATIENT QL REPORTED: YES
GLUCOSE BLD-MCNC: 93 MG/DL (ref 70–99)
HDLC SERPL-MCNC: 81 MG/DL
LDLC SERPL CALC-MCNC: 90 MG/DL
NONHDLC SERPL-MCNC: 102 MG/DL
TRIGL SERPL-MCNC: 58 MG/DL

## 2022-08-10 PROCEDURE — 69209 REMOVE IMPACTED EAR WAX UNI: CPT | Mod: RT | Performed by: FAMILY MEDICINE

## 2022-08-10 PROCEDURE — G0439 PPPS, SUBSEQ VISIT: HCPCS | Mod: 25 | Performed by: FAMILY MEDICINE

## 2022-08-10 PROCEDURE — 36415 COLL VENOUS BLD VENIPUNCTURE: CPT | Performed by: FAMILY MEDICINE

## 2022-08-10 PROCEDURE — 80061 LIPID PANEL: CPT | Performed by: FAMILY MEDICINE

## 2022-08-10 PROCEDURE — 82947 ASSAY GLUCOSE BLOOD QUANT: CPT | Performed by: FAMILY MEDICINE

## 2022-08-10 PROCEDURE — 99213 OFFICE O/P EST LOW 20 MIN: CPT | Mod: 25 | Performed by: FAMILY MEDICINE

## 2022-08-10 RX ORDER — CHLORAL HYDRATE 500 MG
2 CAPSULE ORAL 2 TIMES DAILY
COMMUNITY

## 2022-08-10 RX ORDER — AMPICILLIN TRIHYDRATE 250 MG
600 CAPSULE ORAL 3 TIMES DAILY
COMMUNITY

## 2022-08-10 RX ORDER — ALENDRONATE SODIUM 70 MG/1
TABLET ORAL
Qty: 12 TABLET | Refills: 4 | Status: SHIPPED | OUTPATIENT
Start: 2022-08-10 | End: 2023-08-23

## 2022-08-10 ASSESSMENT — ACTIVITIES OF DAILY LIVING (ADL): CURRENT_FUNCTION: NO ASSISTANCE NEEDED

## 2022-08-10 ASSESSMENT — ENCOUNTER SYMPTOMS
CONSTIPATION: 0
PARESTHESIAS: 0
NAUSEA: 0
HEMATOCHEZIA: 0
SORE THROAT: 0
HEARTBURN: 0
WEAKNESS: 0
FREQUENCY: 0
SHORTNESS OF BREATH: 0
ABDOMINAL PAIN: 0
NERVOUS/ANXIOUS: 0
HEMATURIA: 0
BREAST MASS: 0
DIARRHEA: 0
DIZZINESS: 1
EYE PAIN: 0
FEVER: 0
HEADACHES: 0
CHILLS: 0
ARTHRALGIAS: 1
MYALGIAS: 0
JOINT SWELLING: 0
PALPITATIONS: 0
DYSURIA: 0
COUGH: 0

## 2022-08-10 ASSESSMENT — PAIN SCALES - GENERAL: PAINLEVEL: NO PAIN (0)

## 2022-08-10 NOTE — PATIENT INSTRUCTIONS
Patient Education   Personalized Prevention Plan  You are due for the preventive services outlined below.  Your care team is available to assist you in scheduling these services.  If you have already completed any of these items, please share that information with your care team to update in your medical record.  Health Maintenance Due   Topic Date Due     ANNUAL REVIEW OF HM ORDERS  Never done

## 2022-08-10 NOTE — PROGRESS NOTES
"SUBJECTIVE:   Marbella Welsh is a 67 year old female who presents for Preventive Visit.  1. Right ear plugged.  2. Derm referral- check skin.  3.grieving well- for mom death in summer. Mom was almost 96.  15 yo family dog  suddenly.  Was able to give last week a great sen off to mom with hospice care and shed ied peacefully at home.  3. Very involved in excercise and staying active. Changes physical activity , biking, yoga, talha chi.    Patient has been advised of split billing requirements and indicates understanding: Yes  Are you in the first 12 months of your Medicare coverage?  No    Healthy Habits:     In general, how would you rate your overall health?  Excellent    Frequency of exercise:  6-7 days/week    Duration of exercise:  Greater than 60 minutes    Do you usually eat at least 4 servings of fruit and vegetables a day, include whole grains    & fiber and avoid regularly eating high fat or \"junk\" foods?  Yes    Taking medications regularly:  Yes    Medication side effects:  None    Ability to successfully perform activities of daily living:  No assistance needed    Home Safety:  No safety concerns identified    Hearing Impairment:  No hearing concerns    In the past 6 months, have you been bothered by leaking of urine?  No    In general, how would you rate your overall mental or emotional health?  Excellent      PHQ-2 Total Score: 2    Additional concerns today:  No    Do you feel safe in your environment? Yes    Have you ever done Advance Care Planning? (For example, a Health Directive, POLST, or a discussion with a medical provider or your loved ones about your wishes): Yes, patient states has an Advance Care Planning document and will bring a copy to the clinic.       Fall risk  Fallen 2 or more times in the past year?: No  Any fall with injury in the past year?: No    Cognitive Screening   1) Repeat 3 items (Leader, Season, Table)    2) Clock draw: NORMAL  3) 3 item recall: Recalls 3 objects  Results: " 3 items recalled: COGNITIVE IMPAIRMENT LESS LIKELY    Mini-CogTM Copyright NAY Hurtado. Licensed by the author for use in [facility]; reprinted with permission (deo@[facility]). All rights reserved.      Do you have sleep apnea, excessive snoring or daytime drowsiness?: no    Reviewed and updated as needed this visit by clinical staff   Tobacco  Allergies  Meds  Problems  Med Hx  Surg Hx  Fam Hx  Soc   Hx          Reviewed and updated as needed this visit by Provider   Tobacco  Allergies  Meds  Problems  Med Hx  Surg Hx  Fam Hx           Social History     Tobacco Use     Smoking status: Former Smoker     Packs/day: 0.50     Years: 10.00     Pack years: 5.00     Types: Cigarettes     Start date: 1968     Quit date: 1978     Years since quittin.5     Smokeless tobacco: Never Used     Tobacco comment: I have not smoked since quitting in    Substance Use Topics     Alcohol use: Yes     Comment: 1 light beer ~5-7 days per week     If you drink alcohol do you typically have >3 drinks per day or >7 drinks per week? No    No flowsheet data found.            Current providers sharing in care for this patient include:   Patient Care Team:  Nancy Marie MD as PCP - General (Family Practice)  Nancy Marie MD as Assigned PCP    The following health maintenance items are reviewed in Epic and correct as of today:  There are no preventive care reminders to display for this patient.      Breast CA Risk Assessment (FHS-7) 2021   Do you have a family history of breast, colon, or ovarian cancer? No / Unknown           Pertinent mammograms are reviewed under the imaging tab.    Review of Systems   Constitutional: Negative for chills and fever.   HENT: Positive for ear pain. Negative for congestion, hearing loss and sore throat.    Eyes: Negative for pain and visual disturbance.   Respiratory: Negative for cough and shortness of breath.    Cardiovascular: Negative for chest  "pain, palpitations and peripheral edema.   Gastrointestinal: Negative for abdominal pain, constipation, diarrhea, heartburn, hematochezia and nausea.   Breasts:  Negative for tenderness, breast mass and discharge.   Genitourinary: Negative for dysuria, frequency, genital sores, hematuria, pelvic pain, urgency, vaginal bleeding and vaginal discharge.   Musculoskeletal: Positive for arthralgias. Negative for joint swelling and myalgias.   Skin: Positive for rash.   Neurological: Positive for dizziness. Negative for weakness, headaches and paresthesias.   Psychiatric/Behavioral: Negative for mood changes. The patient is not nervous/anxious.      Constitutional, HEENT, cardiovascular, pulmonary, GI, , musculoskeletal, neuro, skin, endocrine and psych systems are negative, except as otherwise noted.    OBJECTIVE:   /57   Pulse 53   Temp 97.3  F (36.3  C) (Temporal)   Resp 16   Ht 1.688 m (5' 6.44\")   Wt 63.8 kg (140 lb 11.2 oz)   SpO2 97%   BMI 22.41 kg/m   Estimated body mass index is 22.41 kg/m  as calculated from the following:    Height as of this encounter: 1.688 m (5' 6.44\").    Weight as of this encounter: 63.8 kg (140 lb 11.2 oz).  Physical Exam  GENERAL: healthy, alert and no distress  EYES: Eyes grossly normal to inspection, PERRL and conjunctivae and sclerae normal  HENT:right ear impacted cerumen- rest ok.  ear canals and TM's normal, nose and mouth without ulcers or lesions  NECK: no adenopathy, no asymmetry, masses, or scars and thyroid normal to palpation  RESP: lungs clear to auscultation - no rales, rhonchi or wheezes  BREAST: normal without masses, tenderness or nipple discharge and no palpable axillary masses or adenopathy  CV: regular rate and rhythm, normal S1 S2, no S3 or S4, no murmur, click or rub, no peripheral edema and peripheral pulses strong  ABDOMEN: soft, nontender, no hepatosplenomegaly, no masses and bowel sounds normal  MS: no gross musculoskeletal defects noted, no " edema  SKIN: numerous seborrheic keratosis and on left arm a red eczematous rash- ? Possibly at the site of vaccine- advised to use over the counter hydrocortisone no suspicious lesions or rashes  NEURO: Normal strength and tone, mentation intact and speech normal  PSYCH: mentation appears normal, affect normal/bright    Diagnostic Test Results:  Labs reviewed in Epic  No results found for this or any previous visit (from the past 24 hour(s)).    ASSESSMENT / PLAN:   Marbella was seen today for physical.    Diagnoses and all orders for this visit:    Routine general medical examination at a health care facility  Comments:  colonosocopy  clearr 2021, next in 10 yrs 2031  mammogram annuallly - next due   Dexa last: 2021, repeat in 2023- & stop fosamax  Age-related osteoporosis without current pathological fracture  Comments:  started 2018- advised to continue till 2023.  Repeat DEXA futured - 2023  And then stop fosamax- to be off fosamax for 1-2 yr  Meanwhile keep up with weight bearing excercise.  Over the counter vitamin D intake as well.    Orders:  -     alendronate (FOSAMAX) 70 MG tablet; TAKE 1 TAB BY MOUTH WITH 8OZ H2O EVERY 7 DAYS 30 MINS BEFORE BREAKFAST. REMAIN UPRIGHT.  -     DX Hip/Pelvis/Spine; Future      Grieving  Comments:  MOM  summer 2022- almost 96, 5 days short. metastatic cancer.  family dog, age 15-  suddenly- within 2 weeks of mom.  She reports she is grieving well- and over all doing ok- full of gratitude for all the blessings     Hyperlipidemia LDL goal <160  Comments:  red yeast rice. watching diet.  Orders:  -     Lipid panel reflex to direct LDL Fasting; Future  -     Glucose; Future  Recent Labs   Lab Test 09/15/21  0836 20  0915   CHOL 235* 233*   HDL 76 72   * 143*   TRIG 74 91       Seborrheic keratoses  Benign lesions.monitor  Numerous moles  -     Adult Dermatology Referral; Future    Impacted cerumen of right ear  -     REMOVE IMPACTED  "CERUMEN  Ear wash by medical assitant- clear EC- post wash  Encounter for screening mammogram for malignant neoplasm of breast  -     MA Screen Bilateral w/Stephan; Future    Other orders  -     REVIEW OF HEALTH MAINTENANCE PROTOCOL ORDERS        Patient has been advised of split billing requirements and indicates understanding: Yes    COUNSELING:  Reviewed preventive health counseling, as reflected in patient instructions       Regular exercise       Healthy diet/nutrition       Vision screening       Hearing screening    Estimated body mass index is 22.41 kg/m  as calculated from the following:    Height as of this encounter: 1.688 m (5' 6.44\").    Weight as of this encounter: 63.8 kg (140 lb 11.2 oz).        She reports that she quit smoking about 44 years ago. Her smoking use included cigarettes. She started smoking about 53 years ago. She has a 5.00 pack-year smoking history. She has never used smokeless tobacco.      Appropriate preventive services were discussed with this patient, including applicable screening as appropriate for cardiovascular disease, diabetes, osteopenia/osteoporosis, and glaucoma.  As appropriate for age/gender, discussed screening for colorectal cancer, prostate cancer, breast cancer, and cervical cancer. Checklist reviewing preventive services available has been given to the patient.    Reviewed patients plan of care and provided an AVS. The Basic Care Plan (routine screening as documented in Health Maintenance) for Marbella meets the Care Plan requirement. This Care Plan has been established and reviewed with the Patient.    Counseling Resources:  ATP IV Guidelines  Pooled Cohorts Equation Calculator  Breast Cancer Risk Calculator  Breast Cancer: Medication to Reduce Risk  FRAX Risk Assessment  ICSI Preventive Guidelines  Dietary Guidelines for Americans, 2010  [employer]'s MyPlate  ASA Prophylaxis  Lung CA Screening    Nancy Marie MD  [facility]    Identified " Health Risks:

## 2022-08-11 ENCOUNTER — MYC MEDICAL ADVICE (OUTPATIENT)
Dept: FAMILY MEDICINE | Facility: CLINIC | Age: 67
End: 2022-08-11

## 2022-09-20 ENCOUNTER — OFFICE VISIT (OUTPATIENT)
Dept: DERMATOLOGY | Facility: CLINIC | Age: 67
End: 2022-09-20
Attending: FAMILY MEDICINE
Payer: COMMERCIAL

## 2022-09-20 DIAGNOSIS — L82.0 SEBORRHEIC KERATOSIS, INFLAMED: ICD-10-CM

## 2022-09-20 DIAGNOSIS — D22.9 NUMEROUS MOLES: ICD-10-CM

## 2022-09-20 DIAGNOSIS — D22.9 MULTIPLE BENIGN NEVI: Primary | ICD-10-CM

## 2022-09-20 DIAGNOSIS — D18.01 CHERRY ANGIOMA: ICD-10-CM

## 2022-09-20 DIAGNOSIS — L81.4 SOLAR LENTIGO: ICD-10-CM

## 2022-09-20 DIAGNOSIS — L82.1 SEBORRHEIC KERATOSIS: ICD-10-CM

## 2022-09-20 PROCEDURE — 99203 OFFICE O/P NEW LOW 30 MIN: CPT | Performed by: DERMATOLOGY

## 2022-09-20 ASSESSMENT — PAIN SCALES - GENERAL: PAINLEVEL: NO PAIN (0)

## 2022-09-20 NOTE — NURSING NOTE
Dermatology Rooming Note    Marbella Welsh's goals for this visit include:   Chief Complaint   Patient presents with     Skin Check     FBSE.  Has a spot on her left arm.     Vane Isaacs CMA

## 2022-09-20 NOTE — PROGRESS NOTES
[facility] Dermatology Note  Encounter Date: Sep 20, 2022  Office Visit     Dermatology Problem List:  1. Last FBSE 9/20/2022     ____________________________________________    Assessment & Plan:     # ISK, left arm, not bothersome  - Offered cryo, patient deferred  - Reassured of benign nature    # Benign lesions: Multiple benign nevi, solar lentigos, seborrheic keratoses, cherry angiomas. Explained to patient benign nature of lesion. No treatment is necessary at this time unless the lesion changes or becomes symptomatic.   - ABCDs of melanoma were discussed and self skin checks were advised.  - Sun precaution was advised including the use of sun screens of SPF 30 or higher, sun protective clothing, and avoidance of tanning beds.    Procedures Performed:   None.    Follow-up: prn for new or changing lesions    Staff and Scribe:     Scribe Disclosure:  I, Nick Patel, am serving as a scribe to document services personally performed by George Meek MD based on data collection and the provider's statements to me.     Staff attestation:  The documentation recorded by the scribe accurately reflects the services I personally performed and the decisions I personally made. I have made edits where needed.    George Meek MD  Staff Dermatologist and Dermatopathologist  , Department of Dermatology   ____________________________________________    CC: Skin Check (FBSE.  Has a spot on her left arm.)    HPI:  Ms. Marbella Welsh is a(n) 67 year old female who presents today as a new patient for FBSE.    Patient reports spot of concern on left arm. Patient reports no family history of skin cancer. Patient enjoys time outdoors and uses sun protection.    Patient is otherwise feeling well, without additional skin concerns.    Labs Reviewed:  N/A    Physical Exam:  Vitals: There were no vitals taken for this visit.  SKIN: Total skin excluding the undergarment areas was  performed. The exam included the head/face, neck, both arms, chest, back, abdomen, both legs, digits and/or nails.    - There are dome shaped bright red papules on the trunk and extremities.   - Multiple regular brown pigmented macules and papules are identified on the trunk and extremities.   - Scattered brown macules on sun exposed areas.  - There are waxy stuck on tan to brown papules on the trunk and extremities.  - There is a tan to brown waxy stuck on papule with surrounding erythema on the left arm.   - No other lesions of concern on areas examined.     Medications:  Current Outpatient Medications   Medication     alendronate (FOSAMAX) 70 MG tablet     fish oil-omega-3 fatty acids 1000 MG capsule     red yeast rice 600 MG CAPS     VITAMIN D, CHOLECALCIFEROL, PO     No current facility-administered medications for this visit.      Past Medical History:   Patient Active Problem List   Diagnosis     Disorder of bone and cartilage     HYPERLIPIDEMIA LDL GOAL <160     Advanced directives, counseling/discussion     Tinnitus     Breast cancer screening     Past Medical History:   Diagnosis Date     Diverticulosis, sigmoid 2011    seen on colonoscopy     H/O total hysterectomy with removal of both tubes and ovaries     non cancerous     Nasal congestion      Tinnitus 7/23/2013        CC Nancy Marie MD  [address] [place] [address] on close of this encounter.

## 2022-09-20 NOTE — LETTER
9/20/2022       RE: Marbella Welsh  [address]  [place] [place] [address]     Dear Colleague,    Thank you for referring your patient, Marbella Welsh, to the [facility] [place] at [facility]. Please see a copy of my visit note below.    [facility] Dermatology Note  Encounter Date: Sep 20, 2022  Office Visit     Dermatology Problem List:  1. Last FBSE 9/20/2022     ____________________________________________    Assessment & Plan:     # ISK, left arm, not bothersome  - Offered cryo, patient deferred  - Reassured of benign nature    # Benign lesions: Multiple benign nevi, solar lentigos, seborrheic keratoses, cherry angiomas. Explained to patient benign nature of lesion. No treatment is necessary at this time unless the lesion changes or becomes symptomatic.   - ABCDs of melanoma were discussed and self skin checks were advised.  - Sun precaution was advised including the use of sun screens of SPF 30 or higher, sun protective clothing, and avoidance of tanning beds.    Procedures Performed:   None.    Follow-up: prn for new or changing lesions    Staff and Scribe:     Scribe Disclosure:  I, Nick Patel, am serving as a scribe to document services personally performed by George Meek MD based on data collection and the provider's statements to me.     Staff attestation:  The documentation recorded by the scribe accurately reflects the services I personally performed and the decisions I personally made. I have made edits where needed.    George Meek MD  Staff Dermatologist and Dermatopathologist  , Department of Dermatology   ____________________________________________    CC: Skin Check (FBSE.  Has a spot on her left arm.)    HPI:  Ms. Marbella Welsh is a(n) 67 year old female who presents today as a new patient for FBSE.    Patient reports spot of concern on left arm. Patient reports  no family history of skin cancer. Patient enjoys time outdoors and uses sun protection.    Patient is otherwise feeling well, without additional skin concerns.    Labs Reviewed:  N/A    Physical Exam:  Vitals: There were no vitals taken for this visit.  SKIN: Total skin excluding the undergarment areas was performed. The exam included the head/face, neck, both arms, chest, back, abdomen, both legs, digits and/or nails.    - There are dome shaped bright red papules on the trunk and extremities.   - Multiple regular brown pigmented macules and papules are identified on the trunk and extremities.   - Scattered brown macules on sun exposed areas.  - There are waxy stuck on tan to brown papules on the trunk and extremities.  - There is a tan to brown waxy stuck on papule with surrounding erythema on the left arm.   - No other lesions of concern on areas examined.     Medications:  Current Outpatient Medications   Medication     alendronate (FOSAMAX) 70 MG tablet     fish oil-omega-3 fatty acids 1000 MG capsule     red yeast rice 600 MG CAPS     VITAMIN D, CHOLECALCIFEROL, PO     No current facility-administered medications for this visit.      Past Medical History:   Patient Active Problem List   Diagnosis     Disorder of bone and cartilage     HYPERLIPIDEMIA LDL GOAL <160     Advanced directives, counseling/discussion     Tinnitus     Breast cancer screening     Past Medical History:   Diagnosis Date     Diverticulosis, sigmoid 2011    seen on colonoscopy     H/O total hysterectomy with removal of both tubes and ovaries     non cancerous     Nasal congestion      Tinnitus 7/23/2013        CC Nancy Marie MD  [address] [place] [address] on close of this encounter.

## 2022-09-20 NOTE — PATIENT INSTRUCTIONS
Patient Education     Checking for Skin Cancer  You can find cancer early by checking your skin each month. There are 3 kinds of skin cancer. They are melanoma, basal cell carcinoma, and squamous cell carcinoma. Doing monthly skin checks is the best way to find new marks or skin changes. Follow the instructions below for checking your skin.   The ABCDEs of checking moles for melanoma   Check your moles or growths for signs of melanoma using ABCDE:   Asymmetry: the sides of the mole or growth don t match  Border: the edges are ragged, notched, or blurred  Color: the color within the mole or growth varies  Diameter: the mole or growth is larger than 6 mm (size of a pencil eraser)  Evolving: the size, shape, or color of the mole or growth is changing (evolving is not shown in the images below)    Checking for other types of skin cancer  Basal cell carcinoma or squamous cell carcinoma have symptoms such as:     A spot or mole that looks different from all other marks on your skin  Changes in how an area feels, such as itching, tenderness, or pain  Changes in the skin's surface, such as oozing, bleeding, or scaliness  A sore that does not heal  New swelling or redness beyond the border of a mole    Who s at risk?  Anyone can get skin cancer. But you are at greater risk if you have:   Fair skin, light-colored hair, or light-colored eyes  Many moles or abnormal moles on your skin  A history of sunburns from sunlight or tanning beds  A family history of skin cancer  A history of exposure to radiation or chemicals  A weakened immune system  If you have had skin cancer in the past, you are at risk for recurring skin cancer.   How to check your skin  Do your monthly skin checkups in front of a full-length mirror. Check all parts of your body, including your:   Head (ears, face, neck, and scalp)  Torso (front, back, and sides)  Arms (tops, undersides, upper, and lower armpits)  Hands (palms, backs, and fingers, including  under the nails)  Buttocks and genitals  Legs (front, back, and sides)  Feet (tops, soles, toes, including under the nails, and between toes)  If you have a lot of moles, take digital photos of them each month. Make sure to take photos both up close and from a distance. These can help you see if any moles change over time.   Most skin changes are not cancer. But if you see any changes in your skin, call your doctor right away. Only he or she can diagnose a problem. If you have skin cancer, seeing your doctor can be the first step toward getting the treatment that could save your life.   [employer] last reviewed this educational content on 4/1/2019 2000-2020 [employer]. [address] [place] [address], [place] [address]. All rights reserved. This information is not intended as a substitute for professional medical care. Always follow your healthcare professional's instructions.       When should I call my doctor?  If you are worsening or not improving, please, contact us or seek urgent care as noted below.     Who should I call with questions (adults)?  [facility] (adult and pediatric): [phone]  [facility] (adult): [phone]  For urgent needs outside of business hours call the [facility] at [phone] and ask for the dermatology resident on call to be paged  If this is a medical emergency and you are unable to reach an ER, Call [phone]    Who should I call with questions (pediatric)?  [facility]- Pediatric Dermatology  Dr. Essence Perry, Dr. Iliana Deluca, Dr. Cynthia Jj, CICI Crawford, Dr. Jaqueline Mcmullen, Dr. Marianela Antonio & Dr. Nikko Eisenberg  Non-urgent nurse triage line; [phone]- Florence and Michelle GARCIA Care Coordinatormakeda Mckeon (/Complex ) [phone]    If you need a prescription refill, please contact your pharmacy. Refills are approved or denied by our  Physicians during normal business hours, Monday through Fridays  Per office policy, refills will not be granted if you have not been seen within the past year (or sooner depending on your child's condition)    Scheduling Information:  Pediatric Appointment Scheduling and Call Center [phone]  Radiology Scheduling- [phone]  Sedation Unit Scheduling- [phone]  [facility] Scheduling- General [phone]; Pediatric Dermatology [phone]  Main  Services: [phone]  Greek: [phone]  Indian: [phone]  Hmong/[place] citizen/Georgian: [phone]  Preadmission Nursing Department Fax Number: [phone] (Fax all pre-operative paperwork to this number)    For urgent matters arising during evenings, weekends, or holidays that cannot wait for normal business hours please call [phone] and ask for the dermatology resident on call to be paged.

## 2023-02-08 ENCOUNTER — TRANSFERRED RECORDS (OUTPATIENT)
Dept: HEALTH INFORMATION MANAGEMENT | Facility: CLINIC | Age: 68
End: 2023-02-08
Payer: COMMERCIAL

## 2023-03-19 ENCOUNTER — E-VISIT (OUTPATIENT)
Dept: FAMILY MEDICINE | Facility: CLINIC | Age: 68
End: 2023-03-19
Payer: COMMERCIAL

## 2023-03-19 DIAGNOSIS — J01.90 ACUTE SINUSITIS, RECURRENCE NOT SPECIFIED, UNSPECIFIED LOCATION: Primary | ICD-10-CM

## 2023-03-19 PROCEDURE — 99207 PR NON-BILLABLE SERV PER CHARTING: CPT | Performed by: FAMILY MEDICINE

## 2023-03-20 ENCOUNTER — OFFICE VISIT (OUTPATIENT)
Dept: FAMILY MEDICINE | Facility: CLINIC | Age: 68
End: 2023-03-20
Payer: COMMERCIAL

## 2023-03-20 VITALS
DIASTOLIC BLOOD PRESSURE: 69 MMHG | RESPIRATION RATE: 16 BRPM | WEIGHT: 144 LBS | OXYGEN SATURATION: 97 % | HEART RATE: 53 BPM | BODY MASS INDEX: 22.94 KG/M2 | SYSTOLIC BLOOD PRESSURE: 115 MMHG | TEMPERATURE: 97.8 F

## 2023-03-20 DIAGNOSIS — R09.81 SINUS CONGESTION: ICD-10-CM

## 2023-03-20 DIAGNOSIS — H92.02 OTALGIA, LEFT: Primary | ICD-10-CM

## 2023-03-20 PROCEDURE — 99213 OFFICE O/P EST LOW 20 MIN: CPT | Performed by: FAMILY MEDICINE

## 2023-03-20 RX ORDER — AMOXICILLIN 875 MG
875 TABLET ORAL 2 TIMES DAILY
Qty: 20 TABLET | Refills: 0 | Status: SHIPPED | OUTPATIENT
Start: 2023-03-20 | End: 2023-03-30

## 2023-03-20 RX ORDER — FLUTICASONE PROPIONATE 50 MCG
1 SPRAY, SUSPENSION (ML) NASAL DAILY
Qty: 11.1 G | Refills: 1 | Status: SHIPPED | OUTPATIENT
Start: 2023-03-20 | End: 2023-05-11

## 2023-03-20 NOTE — PATIENT INSTRUCTIONS
Dear Marbella Welsh,    We are sorry you are not feeling well. Based on the responses you provided, it is recommended that you be seen in-person.  I can help you get a double book appointment with me in the clinic but that involves waiting longer than expected time.if pain is getting worse than please be seen  in urgent care so we can better evaluate your symptoms. Please click here to find the nearest urgent care location to you.   You will not be charged for this Visit. Thank you for trusting us with your care.    Nancy Marie MD

## 2023-03-20 NOTE — PROGRESS NOTES
"  Assessment & Plan     Otalgia, left  Plan: history of tinitis- that resolved on own  Due to on going pain in left ear with mild post nasal dripping- I do recommend syymptommatic treatment with nasal steroid.Use for 7-10 days.  No need for antibiotic, as no sign of acute infection otitis external on media.  - fluticasone (FLONASE) 50 MCG/ACT nasal spray; Spray 1 spray into both nostrils daily  - Adult ENT  Referral; Future    Sinus congestion  Plan.  Given the history of sinus congestion on and off for over 2 weeks if Flonase does not resolve the symptoms, then it is appropriate to start antibiotic.  - amoxicillin (AMOXIL) 875 MG tablet; Take 1 tablet (875 mg) by mouth 2 times daily for 10 days, for 10 days.    We also discussed optional referral for ENT should there be recurrent problems or unresolved concerns.  - Adult ENT  Referral; Future    No follow-ups on file.   Follow-up Visit   Expected date:  Apr 03, 2023 (Approximate)      Follow Up Appointment Details:     Follow-up with whom?: Me    Follow-Up for what?: Acute Issue Recheck    How?: In Person              Follow-up Visit   Expected date:  Jul 20, 2023 (Approximate)      Follow Up Appointment Details:     Follow-up with whom?: Me    Follow-Up for what?: Adult Preventive    How?: In Person                    Nancy Marie MD  [facility]    Alfredo Tyson is a 67 year old, presenting for the following health issues:  Headache      History of Present Illness       Headaches:   Since the patient's last clinic visit, headaches are: no change  The patient is getting headaches:  Dull ache constant  She is able to do normal daily activities when she has a migraine.  The patient is taking the following rescue/relief medications:  Tylenol   Patient states \"The relief is inconsistent\" from the rescue/relief medications.   The patient is taking the following medications to prevent migraines:  No medications to " prevent migraines  In the past 4 weeks, the patient has gone to an Urgent Care or Emergency Room 0 times times due to headaches.    She eats 2-3 servings of fruits and vegetables daily.She consumes 0 sweetened beverage(s) daily.She exercises with enough effort to increase her heart rate 30 to 60 minutes per day.  She exercises with enough effort to increase her heart rate 7 days per week.   She is taking medications regularly.       Left ear pain on and off with whooshing sounds.  Pain was significant last week.  She has history of tinnitus but that seems to have improved.  She also reports pain on left sinuses specially some left orbital pain and radiating pain on the left maxillary sinus for past 2 weeks..  Pain around left ear.  Its improved but still mild and intermittent.  There is no rash tingling sensation.  There is no chest pain shortness of breath.  There is no drainage or change in hearing on the left side.  She has used over-the-counter guaifenesin without and improvement.  She does have some postnasal dripping.  The sinus headache did improve on ibuprofen but she has not used any.  The pain was only mild to moderate.  There is no change in speech appetite balance.  There is no rash.  Overall in her usual state of health.      Review of Systems   Constitutional, HEENT, cardiovascular, pulmonary, GI, , musculoskeletal, neuro, skin, endocrine and psych systems are negative, except as otherwise noted.      Objective    /69 (BP Location: Left arm, Patient Position: Sitting, Cuff Size: Adult Regular)   Pulse 53   Temp 97.8  F (36.6  C) (Temporal)   Resp 16   Wt 65.3 kg (144 lb)   SpO2 97%   BMI 22.94 kg/m    Body mass index is 22.94 kg/m .  Physical Exam   GENERAL: healthy, alert and no distress  HENT: ear canals and TM's normal, nose and mouth without ulcers or lesions  NECK: no adenopathy, no asymmetry, masses, or scars and thyroid normal to palpation  RESP: lungs clear to auscultation - no  rales, rhonchi or wheezes  CV: regular rate and rhythm, normal S1 S2, no S3 or S4, no murmur, click or rub, no peripheral edema and peripheral pulses strong  ABDOMEN: soft, nontender, no hepatosplenomegaly, no masses and bowel sounds normal  NEURO: Normal strength and tone, mentation intact and speech normal  PSYCH: mentation appears normal, affect normal/bright

## 2023-07-11 ENCOUNTER — PATIENT OUTREACH (OUTPATIENT)
Dept: CARE COORDINATION | Facility: CLINIC | Age: 68
End: 2023-07-11
Payer: COMMERCIAL

## 2023-08-22 ASSESSMENT — ACTIVITIES OF DAILY LIVING (ADL): CURRENT_FUNCTION: NO ASSISTANCE NEEDED

## 2023-08-22 ASSESSMENT — ENCOUNTER SYMPTOMS
ABDOMINAL PAIN: 0
DIZZINESS: 0
JOINT SWELLING: 1
PALPITATIONS: 0
BREAST MASS: 0
HEMATURIA: 0
SHORTNESS OF BREATH: 0
MYALGIAS: 1
FEVER: 0
ARTHRALGIAS: 1
HEARTBURN: 0
DIARRHEA: 0
EYE PAIN: 0
WEAKNESS: 0
COUGH: 0
CHILLS: 0
NERVOUS/ANXIOUS: 0
HEMATOCHEZIA: 0
CONSTIPATION: 0
PARESTHESIAS: 0
DYSURIA: 0
SORE THROAT: 0
NAUSEA: 0
HEADACHES: 0
FREQUENCY: 0

## 2023-08-23 ENCOUNTER — OFFICE VISIT (OUTPATIENT)
Dept: FAMILY MEDICINE | Facility: CLINIC | Age: 68
End: 2023-08-23
Attending: FAMILY MEDICINE
Payer: COMMERCIAL

## 2023-08-23 ENCOUNTER — ANCILLARY PROCEDURE (OUTPATIENT)
Dept: GENERAL RADIOLOGY | Facility: CLINIC | Age: 68
End: 2023-08-23
Attending: FAMILY MEDICINE
Payer: COMMERCIAL

## 2023-08-23 VITALS
WEIGHT: 139.9 LBS | TEMPERATURE: 97.2 F | OXYGEN SATURATION: 97 % | HEIGHT: 66 IN | BODY MASS INDEX: 22.48 KG/M2 | HEART RATE: 54 BPM | RESPIRATION RATE: 16 BRPM | DIASTOLIC BLOOD PRESSURE: 73 MMHG | SYSTOLIC BLOOD PRESSURE: 133 MMHG

## 2023-08-23 DIAGNOSIS — M81.0 AGE-RELATED OSTEOPOROSIS WITHOUT CURRENT PATHOLOGICAL FRACTURE: ICD-10-CM

## 2023-08-23 DIAGNOSIS — Z00.00 ENCOUNTER FOR MEDICARE ANNUAL WELLNESS EXAM: Primary | ICD-10-CM

## 2023-08-23 DIAGNOSIS — Z23 NEED FOR DIPHTHERIA-TETANUS-PERTUSSIS (TDAP) VACCINE: ICD-10-CM

## 2023-08-23 DIAGNOSIS — M15.0 PRIMARY OSTEOARTHRITIS INVOLVING MULTIPLE JOINTS: ICD-10-CM

## 2023-08-23 DIAGNOSIS — M54.31 SCIATICA OF RIGHT SIDE: ICD-10-CM

## 2023-08-23 DIAGNOSIS — E78.5 HYPERLIPIDEMIA LDL GOAL <100: ICD-10-CM

## 2023-08-23 LAB
ANION GAP SERPL CALCULATED.3IONS-SCNC: 12 MMOL/L (ref 7–15)
BUN SERPL-MCNC: 10.1 MG/DL (ref 8–23)
CALCIUM SERPL-MCNC: 9.8 MG/DL (ref 8.8–10.2)
CHLORIDE SERPL-SCNC: 102 MMOL/L (ref 98–107)
CHOLEST SERPL-MCNC: 184 MG/DL
CREAT SERPL-MCNC: 0.67 MG/DL (ref 0.51–0.95)
DEPRECATED CALCIDIOL+CALCIFEROL SERPL-MC: 66 UG/L (ref 20–75)
DEPRECATED HCO3 PLAS-SCNC: 27 MMOL/L (ref 22–29)
ERYTHROCYTE [DISTWIDTH] IN BLOOD BY AUTOMATED COUNT: 12.8 % (ref 10–15)
GFR SERPL CREATININE-BSD FRML MDRD: >90 ML/MIN/1.73M2
GLUCOSE SERPL-MCNC: 94 MG/DL (ref 70–99)
HCT VFR BLD AUTO: 42.5 % (ref 35–47)
HDLC SERPL-MCNC: 70 MG/DL
HGB BLD-MCNC: 14.3 G/DL (ref 11.7–15.7)
LDLC SERPL CALC-MCNC: 104 MG/DL
MCH RBC QN AUTO: 30.6 PG (ref 26.5–33)
MCHC RBC AUTO-ENTMCNC: 33.6 G/DL (ref 31.5–36.5)
MCV RBC AUTO: 91 FL (ref 78–100)
NONHDLC SERPL-MCNC: 114 MG/DL
PLATELET # BLD AUTO: 214 10E3/UL (ref 150–450)
POTASSIUM SERPL-SCNC: 4.4 MMOL/L (ref 3.4–5.3)
RBC # BLD AUTO: 4.67 10E6/UL (ref 3.8–5.2)
SODIUM SERPL-SCNC: 141 MMOL/L (ref 136–145)
TRIGL SERPL-MCNC: 52 MG/DL
VIT B12 SERPL-MCNC: 486 PG/ML (ref 232–1245)
WBC # BLD AUTO: 4.8 10E3/UL (ref 4–11)

## 2023-08-23 PROCEDURE — G0439 PPPS, SUBSEQ VISIT: HCPCS | Performed by: FAMILY MEDICINE

## 2023-08-23 PROCEDURE — 82306 VITAMIN D 25 HYDROXY: CPT | Performed by: FAMILY MEDICINE

## 2023-08-23 PROCEDURE — 99214 OFFICE O/P EST MOD 30 MIN: CPT | Mod: 25 | Performed by: FAMILY MEDICINE

## 2023-08-23 PROCEDURE — 72100 X-RAY EXAM L-S SPINE 2/3 VWS: CPT | Mod: TC | Performed by: RADIOLOGY

## 2023-08-23 PROCEDURE — 80048 BASIC METABOLIC PNL TOTAL CA: CPT | Performed by: FAMILY MEDICINE

## 2023-08-23 PROCEDURE — 36415 COLL VENOUS BLD VENIPUNCTURE: CPT | Performed by: FAMILY MEDICINE

## 2023-08-23 PROCEDURE — 85027 COMPLETE CBC AUTOMATED: CPT | Performed by: FAMILY MEDICINE

## 2023-08-23 PROCEDURE — 82607 VITAMIN B-12: CPT | Performed by: FAMILY MEDICINE

## 2023-08-23 PROCEDURE — 80061 LIPID PANEL: CPT | Performed by: FAMILY MEDICINE

## 2023-08-23 RX ORDER — ALENDRONATE SODIUM 70 MG/1
TABLET ORAL
Qty: 12 TABLET | Refills: 4 | Status: SHIPPED | OUTPATIENT
Start: 2023-08-23 | End: 2023-08-23

## 2023-08-23 RX ORDER — MULTIVITAMIN WITH IRON
1 TABLET ORAL DAILY
COMMUNITY
Start: 2023-06-01

## 2023-08-23 ASSESSMENT — ENCOUNTER SYMPTOMS
JOINT SWELLING: 1
FEVER: 0
ABDOMINAL PAIN: 0
CHILLS: 0
CONSTIPATION: 0
NERVOUS/ANXIOUS: 0
DYSURIA: 0
PARESTHESIAS: 0
ARTHRALGIAS: 1
HEMATOCHEZIA: 0
SHORTNESS OF BREATH: 0
HEARTBURN: 0
NAUSEA: 0
COUGH: 0
HEMATURIA: 0
HEADACHES: 0
PALPITATIONS: 0
FREQUENCY: 0
DIARRHEA: 0
EYE PAIN: 0
MYALGIAS: 1
SORE THROAT: 0
DIZZINESS: 0
BREAST MASS: 0
WEAKNESS: 0

## 2023-08-23 ASSESSMENT — PAIN SCALES - GENERAL: PAINLEVEL: MILD PAIN (2)

## 2023-08-23 ASSESSMENT — ACTIVITIES OF DAILY LIVING (ADL): CURRENT_FUNCTION: NO ASSISTANCE NEEDED

## 2023-08-23 NOTE — PATIENT INSTRUCTIONS
Patient Education   Personalized Prevention Plan  You are due for the preventive services outlined below.  Your care team is available to assist you in scheduling these services.  If you have already completed any of these items, please share that information with your care team to update in your medical record.  Health Maintenance Due   Topic Date Due     Diptheria Tetanus Pertussis (DTAP/TDAP/TD) Vaccine (4 - Td or Tdap) 06/05/2023     ANNUAL REVIEW OF HM ORDERS  08/10/2023     Annual Wellness Visit  08/10/2023

## 2023-08-23 NOTE — PROGRESS NOTES
"SUBJECTIVE:   Marbella is a 68 year old who presents for Preventive Visit.      Are you in the first 12 months of your Medicare coverage?  No    Healthy Habits:     In general, how would you rate your overall health?  Good    Frequency of exercise:  6-7 days/week    Duration of exercise:  Greater than 60 minutes    Do you usually eat at least 4 servings of fruit and vegetables a day, include whole grains    & fiber and avoid regularly eating high fat or \"junk\" foods?  Yes    Taking medications regularly:  Yes    Medication side effects:  None    Ability to successfully perform activities of daily living:  No assistance needed    Home Safety:  No safety concerns identified    Hearing Impairment:  No hearing concerns    In the past 6 months, have you been bothered by leaking of urine?  No    In general, how would you rate your overall mental or emotional health?  Good    Additional concerns today:  Yes        Have you ever done Advance Care Planning? (For example, a Health Directive, POLST, or a discussion with a medical provider or your loved ones about your wishes): Yes, patient states has an Advance Care Planning document and will bring a copy to the clinic.       Fall risk  Fallen 2 or more times in the past year?: No  Any fall with injury in the past year?: No    Cognitive Screening   1) Repeat 3 items (Leader, Season, Table)    2) Clock draw: NORMAL  3) 3 item recall: Recalls 3 objects  Results: 3 items recalled: COGNITIVE IMPAIRMENT LESS LIKELY    Mini-CogTM Copyright S Bryant. Licensed by the author for use in [facility]; reprinted with permission (deo@.[facility]). All rights reserved.          Reviewed and updated as needed this visit by clinical staff   Tobacco  Allergies  Meds  Problems  Med Hx  Surg Hx  Fam Hx          Reviewed and updated as needed this visit by Provider   Tobacco  Allergies  Meds  Problems  Med Hx  Surg Hx  Fam Hx         Social History     Tobacco Use    Smoking " status: Former     Packs/day: 0.50     Years: 10.00     Pack years: 5.00     Types: Cigarettes     Start date: 1968     Quit date: 1978     Years since quittin.6    Smokeless tobacco: Never    Tobacco comments:     I have not smoked since quitting in    Substance Use Topics    Alcohol use: Yes     Comment: 1 light beer ~5-7 days per week             2023     9:56 AM   Alcohol Use   Prescreen: >3 drinks/day or >7 drinks/week? No       Do you have a current opioid prescription? No  Do you use any other controlled substances or medications that are not prescribed by a provider? None              Current providers sharing in care for this patient include:   Patient Care Team:  Nancy Marie MD as PCP - General (Family Practice)  Nancy Marie MD as Assigned PCP  George Meek MD as MD (Dermatology)  George Meek MD as Assigned Surgical Provider    The following health maintenance items are reviewed in Epic and correct as of today:  Health Maintenance   Topic Date Due    DTAP/TDAP/TD IMMUNIZATION (4 - Td or Tdap) 2023    MEDICARE ANNUAL WELLNESS VISIT  08/10/2023    INFLUENZA VACCINE (1) 2023    ANNUAL REVIEW OF HM ORDERS  2024    FALL RISK ASSESSMENT  2024    MAMMO SCREENING  2025    LIPID  08/10/2027    ADVANCE CARE PLANNING  2028    COLORECTAL CANCER SCREENING  2031    DEXA  2036    HEPATITIS C SCREENING  Completed    PHQ-2 (once per calendar year)  Completed    Pneumococcal Vaccine: 65+ Years  Completed    ZOSTER IMMUNIZATION  Completed    COVID-19 Vaccine  Completed    IPV IMMUNIZATION  Aged Out    MENINGITIS IMMUNIZATION  Aged Out     BP Readings from Last 3 Encounters:   23 133/73   23 115/69   08/10/22 125/57    Wt Readings from Last 3 Encounters:   23 63.5 kg (139 lb 14.4 oz)   23 65.3 kg (144 lb)   08/10/22 63.8 kg (140 lb 11.2 oz)                  Mammogram Screenin/9/2021     "10:19 AM   Breast CA Risk Assessment (FHS-7)   Do you have a family history of breast, colon, or ovarian cancer? No / Unknown         Mammogram Screening: Recommended mammography every 1-2 years with patient discussion and risk factor consideration  Pertinent mammograms are reviewed under the imaging tab.    Review of Systems   Constitutional:  Negative for chills and fever.   HENT:  Negative for congestion, ear pain, hearing loss and sore throat.    Eyes:  Negative for pain and visual disturbance.   Respiratory:  Negative for cough and shortness of breath.    Cardiovascular:  Negative for chest pain, palpitations and peripheral edema.   Gastrointestinal:  Negative for abdominal pain, constipation, diarrhea, heartburn, hematochezia and nausea.   Breasts:  Negative for tenderness, breast mass and discharge.   Genitourinary:  Negative for dysuria, frequency, genital sores, hematuria, pelvic pain, urgency, vaginal bleeding and vaginal discharge.   Musculoskeletal:  Positive for arthralgias, joint swelling and myalgias.   Skin:  Negative for rash.   Neurological:  Negative for dizziness, weakness, headaches and paresthesias.   Psychiatric/Behavioral:  Negative for mood changes. The patient is not nervous/anxious.          OBJECTIVE:   /73   Pulse 54   Temp 97.2  F (36.2  C) (Temporal)   Resp 16   Ht 1.687 m (5' 6.4\")   Wt 63.5 kg (139 lb 14.4 oz)   LMP  (LMP Unknown)   SpO2 97%   Breastfeeding No   BMI 22.31 kg/m   Estimated body mass index is 22.31 kg/m  as calculated from the following:    Height as of this encounter: 1.687 m (5' 6.4\").    Weight as of this encounter: 63.5 kg (139 lb 14.4 oz).  Physical Exam  GENERAL APPEARANCE: healthy, alert and no distress  EYES: Eyes grossly normal to inspection, PERRL and conjunctivae and sclerae normal  HENT: ear canals and TM's normal, nose and mouth without ulcers or lesions, oropharynx clear and oral mucous membranes moist  NECK: no adenopathy, no asymmetry, " masses, or scars and thyroid normal to palpation  RESP: lungs clear to auscultation - no rales, rhonchi or wheezes  BREAST: normal without masses, tenderness or nipple discharge and no palpable axillary masses or adenopathy  CV: regular rate and rhythm, normal S1 S2, no S3 or S4, no murmur, click or rub, no peripheral edema and peripheral pulses strong  ABDOMEN: soft, nontender, no hepatosplenomegaly, no masses and bowel sounds normal  MS: no musculoskeletal defects are noted and gait is age appropriate without ataxia  SKIN: no suspicious lesions or rashes  NEURO: Normal strength and tone, sensory exam grossly normal, mentation intact and speech normal  PSYCH: mentation appears normal and affect normal/bright    Diagnostic Test Results:  Labs reviewed in Epic  No results found for this or any previous visit (from the past 24 hour(s)).    ASSESSMENT / PLAN:   Marbella was seen today for physical.    Diagnoses and all orders for this visit:    Encounter for Medicare annual wellness exam  Comments:  colonosocopy  clear 08/ 2021, Q 10 yrs -Next in 08/2031  mammogram annuallly - completed 2/2023  Dexa last: 04/2021, repeat in 6/2023- & stop fosamax    Age-related osteoporosis without current pathological fracture  Comments:  Fosamax started 06/2018-for advanced osteopenia   Advised to stop 8/2023- for at least 1-2 yrs .  Repeat DEXA this yr.  Orders:  -     Discontinue: alendronate (FOSAMAX) 70 MG tablet; TAKE 1 TAB BY MOUTH WITH 8OZ H2O EVERY 7 DAYS 30 MINS BEFORE BREAKFAST. REMAIN UPRIGHT.  -     DX Hip/Pelvis/Spine; Future    Hyperlipidemia LDL goal <100  Comments:      Slightly elevated LDL than last year   Advised to avoid light bear as well  Continue with excercise and over the counter red yeast rice.  Recheck fasting lipid next yr   Strong consideration should be given to prescription lipid lowering medications- such as rosuvastatin, crestor to  decrease the risk of fatal/non fatal heart attack and stroke based on  the calculation. Its 8.9, that's above average   Orders:  -     Lipid Profile (Chol, Trig, HDL, LDL calc); Future  Recent Labs   Lab Test 08/23/23  0809 08/10/22  0902   CHOL 184 183   HDL 70 81   * 90   TRIG 52 58    The 10-year ASCVD risk score (Brittanie ANDREA, et al., 2019) is: 8.4%    Values used to calculate the score:      Age: 68 years      Sex: Female      Is Non- : No      Diabetic: No      Tobacco smoker: No      Systolic Blood Pressure: 133 mmHg      Is BP treated: No      HDL Cholesterol: 70 mg/dL      Total Cholesterol: 184 mg/dL    Primary osteoarthritis involving multiple joints  Comments:  mild aches and pain in toe and hip  Orders:  -     Basic metabolic panel  (Ca, Cl, CO2, Creat, Gluc, K, Na, BUN); Future  -     CBC with platelets; Future  -     Vitamin B12; Future  -     Vitamin D Deficiency; Future    Sciatica of right side  Comments:  advised to start PT.  fup in 1-3 months for unresolved concerns  Orders:  -     XR Lumbar Spine 2/3 Views; Future  -     Physical Therapy Referral; Future    Need for diphtheria-tetanus-pertussis (Tdap) vaccine  -     Tdap, tetanus-diptheria-acell pertussis, (BOOSTRIX) 5-2.5-18.5 LF-MCG/0.5 MUKESH injection; Inject 0.5 mLs into the muscle once for 1 dose    Other orders  -     PRIMARY CARE FOLLOW-UP SCHEDULING  -     REVIEW OF HEALTH MAINTENANCE PROTOCOL ORDERS  -     PRIMARY CARE FOLLOW-UP SCHEDULING; Future        Patient has been advised of split billing requirements and indicates understanding: Yes      COUNSELING:  Reviewed preventive health counseling, as reflected in patient instructions       Regular exercise       Healthy diet/nutrition       Vision screening       Dental care       Bladder control       Fall risk prevention       Alcohol Use        Folic Acid       Osteoporosis prevention/bone health       Advanced Planning         She reports that she quit smoking about 45 years ago. Her smoking use included cigarettes. She  started smoking about 55 years ago. She has a 5.00 pack-year smoking history. She has never used smokeless tobacco.      Appropriate preventive services were discussed with this patient, including applicable screening as appropriate for cardiovascular disease, diabetes, osteopenia/osteoporosis, and glaucoma.  As appropriate for age/gender, discussed screening for colorectal cancer, prostate cancer, breast cancer, and cervical cancer. Checklist reviewing preventive services available has been given to the patient.    Reviewed patients plan of care and provided an AVS. The Basic Care Plan (routine screening as documented in Health Maintenance) for Marbella meets the Care Plan requirement. This Care Plan has been established and reviewed with the Patient.          Nancy Marie MD  [facility]    Identified Health Risks:  I have reviewed Opioid Use Disorder and Substance Use Disorder risk factors and made any needed referrals.

## 2023-08-28 ENCOUNTER — MYC MEDICAL ADVICE (OUTPATIENT)
Dept: FAMILY MEDICINE | Facility: CLINIC | Age: 68
End: 2023-08-28
Payer: COMMERCIAL

## 2023-08-29 ENCOUNTER — HOSPITAL ENCOUNTER (OUTPATIENT)
Dept: BONE DENSITY | Facility: CLINIC | Age: 68
Discharge: HOME | End: 2023-08-29
Attending: FAMILY MEDICINE | Admitting: FAMILY MEDICINE
Payer: COMMERCIAL

## 2023-08-29 DIAGNOSIS — M81.0 AGE-RELATED OSTEOPOROSIS WITHOUT CURRENT PATHOLOGICAL FRACTURE: ICD-10-CM

## 2023-08-29 PROCEDURE — 77080 DXA BONE DENSITY AXIAL: CPT

## 2023-08-29 NOTE — TELEPHONE ENCOUNTER
Recent Labs   Lab Test 08/23/23  0809 08/10/22  0902   CHOL 184 183   HDL 70 81   * 90   TRIG 52 58        The 10-year ASCVD risk score (Brittanie ANDREA, et al., 2019) is: 8.4%    Values used to calculate the score:      Age: 68 years      Sex: Female      Is Non- : No      Diabetic: No      Tobacco smoker: No      Systolic Blood Pressure: 133 mmHg      Is BP treated: No      HDL Cholesterol: 70 mg/dL      Total Cholesterol: 184 mg/dL

## 2023-08-30 ENCOUNTER — THERAPY VISIT (OUTPATIENT)
Dept: PHYSICAL THERAPY | Facility: CLINIC | Age: 68
End: 2023-08-30
Attending: FAMILY MEDICINE
Payer: COMMERCIAL

## 2023-08-30 DIAGNOSIS — M54.31 SCIATICA OF RIGHT SIDE: ICD-10-CM

## 2023-08-30 DIAGNOSIS — M54.41 RIGHT-SIDED LOW BACK PAIN WITH RIGHT-SIDED SCIATICA, UNSPECIFIED CHRONICITY: Primary | ICD-10-CM

## 2023-08-30 PROCEDURE — 97161 PT EVAL LOW COMPLEX 20 MIN: CPT | Mod: GP | Performed by: PHYSICAL THERAPIST

## 2023-08-30 PROCEDURE — 97110 THERAPEUTIC EXERCISES: CPT | Mod: GP | Performed by: PHYSICAL THERAPIST

## 2023-08-30 NOTE — PROGRESS NOTES
"PHYSICAL THERAPY EVALUATION  Type of Visit: Evaluation    See electronic medical record for Abuse and Falls Screening details.    Subjective       Presenting condition or subjective complaint: I have pain in my right hip, lower and midback.  I have had episodes of sciatica in the past but not lately.  I'm very active and modify my activities to adjust to pain, to achieve a new \"balance\" that allows me to continue to be active.  Date of onset: 08/23/23 (date patient saw MD for this issue)    Relevant medical history: Arthritis; Osteoporosis   Dates & types of surgery: toe repair, broken wrist, hysterectomy    Prior diagnostic imaging/testing results: X-ray; Bone scan     Prior therapy history for the same diagnosis, illness or injury: No          Prior Level of Function  Transfers:   Ambulation:   ADL:   IADL:     Living Environment  Social support: With a significant other or spouse   Type of home: House; Multi-level   Stairs to enter the home: Yes 6 Is there a railing: Yes   Ramp: No   Stairs inside the home: Yes 12 Is there a railing: Yes   Help at home: None  Equipment owned:       Employment: No    Hobbies/Interests: art, puzzles, reading, exercise, qigong, household and other projects, cooking/baking, community events, travel, friends/family    Patient goals for therapy: Stay active, find a good balance to adjust to my more frequent pain so that I don't have \"flare-ups\", understand what is causing the pain and how to avoid it if possible.    Goes to chiropractor once a month and gets a massage once a month.  Rides the recumbant bike and elliptical and Nordic walking with poles    Has osteopenia    Pain assessment:      Objective   LUMBAR SPINE EVALUATION  PAIN: Pain is Exacerbated By: pain with bending or with pressure over the right low back.  Vacuuming can irritate as can riding her bike but stopped riding outside recently.  Does Dc Gong daily.  Sometimes stretching to the side bothers  and sometimes pain " will keep her up at night.   INTEGUMENTARY (edema, incisions):   POSTURE: Standing Posture: Thoracic kyphosis increased  GAIT:   Weightbearing Status: WBAT  Assistive Device(s): None  Gait Deviations:  decreased extension  BALANCE/PROPRIOCEPTION: WFL  WEIGHTBEARING ALIGNMENT:   NON-WEIGHTBEARING ALIGNMENT:    ROM:   PELVIC/SI SCREEN:   STRENGTH:     MYOTOMES:   DTR S:   CORD SIGNS:   DERMATOMES:   NEURAL TENSION:  back pain with slump on the right  FLEXIBILITY:  pain right hip at end range of passive flexion with limited mobility compared to the left, decreased hip extension as well  LUMBAR/HIP Special Tests:  Fingers to toes with pulls on the right.  Extension pulls on the right pelvis in the front and low back in the back    PELVIS/SI SPECIAL TESTS:   FUNCTIONAL TESTS:   PALPATION:   SPINAL SEGMENTAL CONCLUSIONS:  decreased thoracic mobility, kyphosis      Assessment & Plan   CLINICAL IMPRESSIONS  Medical Diagnosis: right sciatica    Treatment Diagnosis: sciatica, right   Impression/Assessment: Patient is a 68 year old female with right low back complaints.  The following significant findings have been identified: Pain, Decreased ROM/flexibility, Decreased joint mobility, Decreased strength, and Decreased activity tolerance. These impairments interfere with their ability to perform self care tasks, recreational activities, household chores, and community mobility as compared to previous level of function.     Clinical Decision Making (Complexity):  Clinical Presentation: Stable/Uncomplicated  Clinical Presentation Rationale: based on medical and personal factors listed in PT evaluation  Clinical Decision Making (Complexity): Low complexity    PLAN OF CARE  Treatment Interventions:  Interventions: Neuromuscular Re-education, Therapeutic Activity, Therapeutic Exercise    Long Term Goals     PT Goal 1  Goal Identifier: walking  Goal Description: patient able to walk 60 min without back pain  Rationale: to maximize  safety and independence within the community  Target Date: 11/27/23      Frequency of Treatment: once a month  Duration of Treatment: 2 months    Recommended Referrals to Other Professionals:   Education Assessment:   Learner/Method: Patient;Listening;Reading;Demonstration;Pictures/Video;No Barriers to Learning    Risks and benefits of evaluation/treatment have been explained.   Patient/Family/caregiver agrees with Plan of Care.     Evaluation Time:     PT Eval, Low Complexity Minutes (25973): 15       Signing Clinician: Mireille Henley PT      [facility]                                                                                   OUTPATIENT PHYSICAL THERAPY      PLAN OF TREATMENT FOR OUTPATIENT REHABILITATION   Patient's Last Name, First Name, Marbella Wilson    YOB: 1955   Provider's Name   [facility]   Medical Record No.  [id number]     Onset Date: 08/23/23 (date patient saw MD for this issue)  Start of Care Date: 08/30/23     Medical Diagnosis:  right sciatica      PT Treatment Diagnosis:  sciatica, right Plan of Treatment  Frequency/Duration: once a month/ 2 months    Certification date from 08/30/23 to 11/27/23         See note for plan of treatment details and functional goals     Mireille Henley, PT                         I CERTIFY THE NEED FOR THESE SERVICES FURNISHED UNDER        THIS PLAN OF TREATMENT AND WHILE UNDER MY CARE     (Physician attestation of this document indicates review and certification of the therapy plan).                Referring Provider:  Nancy Marie      Initial Assessment  See Epic Evaluation- Start of Care Date: 08/30/23

## 2023-09-05 ENCOUNTER — MYC MEDICAL ADVICE (OUTPATIENT)
Dept: FAMILY MEDICINE | Facility: CLINIC | Age: 68
End: 2023-09-05
Payer: COMMERCIAL

## 2023-09-05 DIAGNOSIS — M25.551 HIP PAIN, RIGHT: Primary | ICD-10-CM

## 2023-09-10 ENCOUNTER — MYC MEDICAL ADVICE (OUTPATIENT)
Dept: FAMILY MEDICINE | Facility: CLINIC | Age: 68
End: 2023-09-10
Payer: COMMERCIAL

## 2023-09-11 ENCOUNTER — TELEPHONE (OUTPATIENT)
Dept: ORTHOPEDICS | Facility: CLINIC | Age: 68
End: 2023-09-11

## 2023-09-11 ENCOUNTER — ANCILLARY PROCEDURE (OUTPATIENT)
Dept: GENERAL RADIOLOGY | Facility: CLINIC | Age: 68
End: 2023-09-11
Attending: FAMILY MEDICINE
Payer: COMMERCIAL

## 2023-09-11 DIAGNOSIS — M25.551 HIP PAIN, RIGHT: ICD-10-CM

## 2023-09-11 PROCEDURE — 73502 X-RAY EXAM HIP UNI 2-3 VIEWS: CPT | Mod: TC | Performed by: RADIOLOGY

## 2023-09-11 NOTE — TELEPHONE ENCOUNTER
[facility] Call Center    Phone Message    May a detailed message be left on voicemail: yes     Reason for Call: Other: Patient is being referred to us by Dr. Marie for rib pain. Patient stated that it does not involve her back. Do we have anyone that can see this patient for rib pain?     Action Taken: Message routed to:  [facility] [place] [facility]    Travel Screening: Not Applicable

## 2023-09-12 ENCOUNTER — OFFICE VISIT (OUTPATIENT)
Dept: URGENT CARE | Facility: URGENT CARE | Age: 68
End: 2023-09-12
Payer: COMMERCIAL

## 2023-09-12 ENCOUNTER — ANCILLARY PROCEDURE (OUTPATIENT)
Dept: GENERAL RADIOLOGY | Facility: CLINIC | Age: 68
End: 2023-09-12
Attending: EMERGENCY MEDICINE
Payer: COMMERCIAL

## 2023-09-12 VITALS
HEART RATE: 67 BPM | WEIGHT: 143 LBS | TEMPERATURE: 98.6 F | BODY MASS INDEX: 22.8 KG/M2 | SYSTOLIC BLOOD PRESSURE: 117 MMHG | DIASTOLIC BLOOD PRESSURE: 59 MMHG | OXYGEN SATURATION: 96 %

## 2023-09-12 DIAGNOSIS — M25.512 ACUTE PAIN OF LEFT SHOULDER: ICD-10-CM

## 2023-09-12 DIAGNOSIS — R07.89 CHEST WALL PAIN: ICD-10-CM

## 2023-09-12 DIAGNOSIS — R07.89 CHEST WALL PAIN: Primary | ICD-10-CM

## 2023-09-12 PROCEDURE — 99214 OFFICE O/P EST MOD 30 MIN: CPT | Performed by: EMERGENCY MEDICINE

## 2023-09-12 PROCEDURE — 71101 X-RAY EXAM UNILAT RIBS/CHEST: CPT | Mod: TC | Performed by: RADIOLOGY

## 2023-09-12 NOTE — PROGRESS NOTES
Acute pain of left shoulder    Chest wall pain    - XR Ribs & Chest Left G/E 3 Views; Future  -PT referral    -XR negative for acute fracture.  Continue tylenol as needed for pain. Offered topical lidocaine patch, declines any further pain management at this time.     Bessie Burdick, KATJA Student  Eder GALDAME[facility] URGENT CARE    Subjective   68 year old who presents to clinic today for the following health issues:    Urgent Care       HPI   Marbella reports falling Saturday while she was doing her PT exercises for her back/hips. She twisted and landed on her left shoulder on a yoga mat on a carpeted area. After injury she noted some pain under her left armpit as well as into the left lateral chest in line with underarm and into the scapula. Now, the mild pain present with aggravating movements in the left lateral chest and left underarm. She has a history of osteopenia and says her primary recommended her to come in to rule out fracture. Denies troubles breathing, palpitations. Denies having any injury to the head. Therapies tried so far include tylenol, which she says has managed her pain well thus far.       Review of Systems   Review of Systems   See HPI    Objective    Temp: 98.6  F (37  C) Temp src: Tympanic BP: 117/59 Pulse: 67     SpO2: 96 %       Physical Exam   Physical Exam  Vitals reviewed.   Constitutional:       Appearance: Normal appearance.   Cardiovascular:      Rate and Rhythm: Normal rate and regular rhythm.   Pulmonary:      Effort: Pulmonary effort is normal.      Breath sounds: Normal breath sounds.   Chest:      Chest wall: Tenderness present.      Comments: Left sided chest wall tenderness  Musculoskeletal:      Right shoulder: Normal.      Left shoulder: Tenderness present. No swelling or deformity. Normal strength.      Cervical back: Normal range of motion.      Comments: Left underarm tenderness into left chest wall.    Neurological:      Mental Status: She is alert.    Psychiatric:         Mood and Affect: Mood normal.          Xray - Reviewed and interpreted by me.  Negative for acute rib fracture.

## 2023-09-14 ENCOUNTER — DOCUMENTATION ONLY (OUTPATIENT)
Dept: OTHER | Facility: CLINIC | Age: 68
End: 2023-09-14
Payer: COMMERCIAL

## 2023-09-22 ENCOUNTER — MYC MEDICAL ADVICE (OUTPATIENT)
Dept: FAMILY MEDICINE | Facility: CLINIC | Age: 68
End: 2023-09-22
Payer: COMMERCIAL

## 2023-10-13 DIAGNOSIS — M81.0 AGE-RELATED OSTEOPOROSIS WITHOUT CURRENT PATHOLOGICAL FRACTURE: ICD-10-CM

## 2023-10-13 RX ORDER — ALENDRONATE SODIUM 70 MG/1
TABLET ORAL
Qty: 12 TABLET | Refills: 4 | Status: SHIPPED | OUTPATIENT
Start: 2023-10-13 | End: 2024-09-10

## 2023-12-19 ENCOUNTER — MYC MEDICAL ADVICE (OUTPATIENT)
Dept: FAMILY MEDICINE | Facility: CLINIC | Age: 68
End: 2023-12-19
Payer: COMMERCIAL

## 2023-12-20 NOTE — TELEPHONE ENCOUNTER
A.S,      Please see Return Path message.  Do you want to do some type of visit?      Elli Alvarez RN

## 2024-03-19 ENCOUNTER — TRANSFERRED RECORDS (OUTPATIENT)
Dept: HEALTH INFORMATION MANAGEMENT | Facility: CLINIC | Age: 69
End: 2024-03-19
Payer: COMMERCIAL

## 2024-09-09 NOTE — PROGRESS NOTES
Preventive Care Visit  [facility]  Nancy Marie MD, Family Medicine  Sep 10, 2024      Assessment & Plan     Encounter for Medicare annual wellness exam  Comments:  colonosocopy  clear 08/ 2021, Q 10 yrs -Next in 08/2031  mammogram annuallly - completed 2/2023  Dexa last: 04/2023, repeat in 6/2025- & stop fosamax in 06/2023 after 5 yrs of use for      Age-related osteoporosis without current pathological fracture  Comments:  Fosamax started 06/2018-for advanced osteopenia   Advised to stop 8/2023- for at least 1-2 yrs .  Repeat DEXA  2026 .      Hyperlipidemia LDL goal <160  - Lipid panel reflex to direct LDL - fasting  -add CT coronoary calcium score.  If 0 no need for medications  If high will consider statins   The 10-year ASCVD risk score (Brittanie ANDREA, et al., 2019) is: 7.5%    Values used to calculate the score:      Age: 69 years      Sex: Female      Is Non- : No      Diabetic: No      Tobacco smoker: No      Systolic Blood Pressure: 117 mmHg      Is BP treated: No      HDL Cholesterol: 70 mg/dL      Total Cholesterol: 184 mg/dL    Disorder of bone and cartilage    - Comprehensive metabolic panel (BMP + Alb, Alk Phos, ALT, AST, Total. Bili, TP); Future  - CBC with platelets; Future      Anxiety  Plan: start counseling  Stay physically active       9/10/2024     7:32 AM   DEANNA-7 SCORE   Total Score 7 (mild anxiety)   Total Score 7          Patient has been advised of split billing requirements and indicates understanding: Yes        Counseling  Appropriate preventive services were addressed with this patient via screening, questionnaire, or discussion as appropriate for fall prevention, nutrition, physical activity, Tobacco-use cessation, social engagement, weight loss and cognition.  Checklist reviewing preventive services available has been given to the patient.  Reviewed patient's diet, addressing concerns and/or questions.       Work on weight loss  Regular  exercise    Subjective   Marbella is a 69 year old, presenting for the following:  Physical (AWV)        9/10/2024     7:38 AM   Additional Questions   Roomed by JOSE Joseph   Accompanied by N/A         9/10/2024     7:38 AM   Patient Reported Additional Medications   Patient reports taking the following new medications probiotic once daily, multivitamin once daily       Health Care Directive  Patient has a Health Care Directive on file  Advance care planning document is on file and is current.    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 Due 3/2026.  -DEXA: Last done 8/2023 (impression: osteopenia). Due 8/2026.  -Colon Cancer Screening: Last done via colonoscopy on 8/2021. (Impression: diverticulosis in sigmoid colon, examination was otherwise normal, no specimens collected). Due 8/2031.  -Dermatology: Pt verbalized they do not meet with dermatology regularly. .  -Immunizations: Patient is due/able to receive at clinic today: Flu - Pt reports planning to receive tomorrow at the pharmacy. Pt reports plans to receive COVID vaccine booster tomorrow as well.     -Other Pt Concerns: Pt verbalized would like to discuss if she should continue taking red yeast rice.   Wt lifting/wt machines twice a week- about a month ago.  Finished 5 yrs of fosamax            9/9/2024   General Health   How would you rate your overall physical health? Excellent   Feel stress (tense, anxious, or unable to sleep) To some extent      (!) STRESS CONCERN      9/9/2024   Nutrition   Diet: Regular (no restrictions)            9/9/2024   Exercise   Days per week of moderate/strenous exercise 7 days   Average minutes spent exercising at this level 60 min             9/9/2024   Social Factors   Frequency of gathering with friends or relatives Three times a week   Worry food won't last until get money to buy more No   Food not last or not have enough money for food? No   Do you have housing? (Housing is defined as stable permanent housing and does not include staying ouside in a car, in a tent, in an abandoned building, in an overnight shelter, or couch-surfing.) Yes   Are you worried about losing your housing? No   Lack of transportation? No   Unable to get utilities (heat,electricity)? No            9/9/2024   Fall Risk   Fallen 2 or more times in the past year? No    No   Trouble with walking or balance? No    No       Multiple values from one day are sorted in reverse-chronological order          9/9/2024   Activities of Daily Living- Home Safety   Needs help with the following daily activites None of the above   Safety concerns in the home None of the above            9/9/2024   Dental   Dentist two times every year? Yes            9/9/2024   Hearing Screening   Hearing concerns? None of the above            9/9/2024   Driving Risk Screening   Patient/family members have concerns about driving No            9/9/2024   General Alertness/Fatigue Screening   Have you been more tired than usual lately? No            9/9/2024   Urinary Incontinence Screening   Bothered by leaking urine in past 6 months No            9/9/2024   TB Screening   Were you born outside of the [place]? No            Today's PHQ-2 Score:       9/9/2024     1:03 PM   PHQ-2 ( 1999 Pfizer)   Q1: Little interest or pleasure in doing things 1   Q2: Feeling down, depressed or hopeless 1   PHQ-2 Score 2   Q1: Little interest or pleasure in doing things Several days   Q2: Feeling down, depressed or hopeless Several days   PHQ-2 Score 2           9/9/2024   Substance Use   Alcohol more than 3/day or more than 7/wk No   Do you have a current opioid prescription? No   How severe/bad is pain from 1 to 10? 1/10   Do you  use any other substances recreationally? No        Social History     Tobacco Use    Smoking status: Former     Current packs/day: 0.00     Average packs/day: 0.5 packs/day for 10.0 years (5.0 ttl pk-yrs)     Types: Cigarettes     Start date: 1968     Quit date: 1978     Years since quittin.6    Smokeless tobacco: Never    Tobacco comments:     I have not smoked since quitting in    Vaping Use    Vaping status: Never Used   Substance Use Topics    Alcohol use: Yes     Comment: 1 light beer ~5-7 days per week    Drug use: No          Mammogram Screening - Mammogram every 1-2 years updated in Health Maintenance based on mutual decision making      History of abnormal Pap smear: No - age 65 or older with adequate negative prior screening test results (3 consecutive negative cytology results, 2 consecutive negative cotesting results, or 2 consecutive negative HrHPV test results within 10 years, with the most recent test occurring within the recommended screening interval for the test used)       ASCVD Risk   The 10-year ASCVD risk score (Brittanie ANDREA, et al., 2019) is: 7.5%    Values used to calculate the score:      Age: 69 years      Sex: Female      Is Non- : No      Diabetic: No      Tobacco smoker: No      Systolic Blood Pressure: 117 mmHg      Is BP treated: No      HDL Cholesterol: 70 mg/dL      Total Cholesterol: 184 mg/dL            Reviewed and updated as needed this visit by Provider   Tobacco  Allergies  Meds  Problems  Med Hx  Surg Hx  Fam Hx            BP Readings from Last 3 Encounters:   09/10/24 117/54   23 117/59   23 133/73    Wt Readings from Last 3 Encounters:   09/10/24 62.7 kg (138 lb 4.8 oz)   23 64.9 kg (143 lb)   23 63.5 kg (139 lb 14.4 oz)                  Current providers sharing in care for this patient include:  Patient Care Team:  Nancy Marie MD as PCP - General (Family Practice)  Nancy Marie,  "MD as Assigned PCP  George Meek MD as MD (Dermatology)    The following health maintenance items are reviewed in Epic and correct as of today:  Health Maintenance   Topic Date Due    LIPID  08/23/2024    INFLUENZA VACCINE (1) 09/01/2024    MEDICARE ANNUAL WELLNESS VISIT  09/10/2025    ANNUAL REVIEW OF HM ORDERS  09/10/2025    FALL RISK ASSESSMENT  09/10/2025    MAMMO SCREENING  03/19/2026    GLUCOSE  08/23/2026    DEXA  08/29/2026    ADVANCE CARE PLANNING  09/10/2029    COLORECTAL CANCER SCREENING  08/02/2031    DTAP/TDAP/TD IMMUNIZATION (6 - Td or Tdap) 08/28/2033    HEPATITIS C SCREENING  Completed    PHQ-2 (once per calendar year)  Completed    Pneumococcal Vaccine: 65+ Years  Completed    ZOSTER IMMUNIZATION  Completed    RSV VACCINE  Completed    COVID-19 Vaccine  Completed    HPV IMMUNIZATION  Aged Out    MENINGITIS IMMUNIZATION  Aged Out    RSV MONOCLONAL ANTIBODY  Aged Out         Review of Systems  Constitutional, HEENT, cardiovascular, pulmonary, GI, , musculoskeletal, neuro, skin, endocrine and psych systems are negative, except as otherwise noted.     Objective    Exam  /54 (BP Location: Left arm, Patient Position: Sitting, Cuff Size: Adult Regular)   Pulse 52   Temp 98.8  F (37.1  C) (Temporal)   Resp 16   Ht 1.683 m (5' 6.25\")   Wt 62.7 kg (138 lb 4.8 oz)   LMP  (LMP Unknown)   SpO2 94%   BMI 22.15 kg/m     Estimated body mass index is 22.15 kg/m  as calculated from the following:    Height as of this encounter: 1.683 m (5' 6.25\").    Weight as of this encounter: 62.7 kg (138 lb 4.8 oz).    Physical Exam  GENERAL: alert and no distress  EYES: Eyes grossly normal to inspection, PERRL and conjunctivae and sclerae normal  HENT: ear canals and TM's normal, nose and mouth without ulcers or lesions  NECK: no adenopathy, no asymmetry, masses, or scars  RESP: lungs clear to auscultation - no rales, rhonchi or wheezes  BREAST: normal without masses, tenderness or nipple discharge and no " palpable axillary masses or adenopathy  CV: regular rate and rhythm, normal S1 S2, no S3 or S4, no murmur, click or rub, no peripheral edema  ABDOMEN: soft, nontender, no hepatosplenomegaly, no masses and bowel sounds normal  MS: no gross musculoskeletal defects noted, no edema  SKIN: no suspicious lesions or rashes  NEURO: Normal strength and tone, mentation intact and speech normal  PSYCH: mentation appears normal, affect normal/bright         9/10/2024   Mini Cog   Clock Draw Score 2 Normal   3 Item Recall 3 objects recalled   Mini Cog Total Score 5                 Signed Electronically by: Nancy Marie MD    Answers submitted by the patient for this visit:  Patient Health Questionnaire (Submitted on 9/10/2024)  If you checked off any problems, how difficult have these problems made it for you to do your work, take care of things at home, or get along with other people?: Not difficult at all  PHQ9 TOTAL SCORE: 2  Patient Health Questionnaire (G7) (Submitted on 9/10/2024)  DEANNA 7 TOTAL SCORE: 7

## 2024-09-10 ENCOUNTER — OFFICE VISIT (OUTPATIENT)
Dept: FAMILY MEDICINE | Facility: CLINIC | Age: 69
End: 2024-09-10
Attending: FAMILY MEDICINE
Payer: COMMERCIAL

## 2024-09-10 VITALS
RESPIRATION RATE: 16 BRPM | HEIGHT: 66 IN | TEMPERATURE: 98.8 F | DIASTOLIC BLOOD PRESSURE: 54 MMHG | BODY MASS INDEX: 22.23 KG/M2 | SYSTOLIC BLOOD PRESSURE: 117 MMHG | OXYGEN SATURATION: 94 % | WEIGHT: 138.3 LBS | HEART RATE: 52 BPM

## 2024-09-10 DIAGNOSIS — M81.0 AGE-RELATED OSTEOPOROSIS WITHOUT CURRENT PATHOLOGICAL FRACTURE: ICD-10-CM

## 2024-09-10 DIAGNOSIS — Z00.00 ENCOUNTER FOR MEDICARE ANNUAL WELLNESS EXAM: Primary | ICD-10-CM

## 2024-09-10 DIAGNOSIS — E78.5 HYPERLIPIDEMIA LDL GOAL <160: ICD-10-CM

## 2024-09-10 DIAGNOSIS — M89.9 DISORDER OF BONE AND CARTILAGE: ICD-10-CM

## 2024-09-10 DIAGNOSIS — F41.9 ANXIETY: ICD-10-CM

## 2024-09-10 DIAGNOSIS — M94.9 DISORDER OF BONE AND CARTILAGE: ICD-10-CM

## 2024-09-10 LAB
ALBUMIN SERPL BCG-MCNC: 4.6 G/DL (ref 3.5–5.2)
ALP SERPL-CCNC: 76 U/L (ref 40–150)
ALT SERPL W P-5'-P-CCNC: 32 U/L (ref 0–50)
ANION GAP SERPL CALCULATED.3IONS-SCNC: 10 MMOL/L (ref 7–15)
AST SERPL W P-5'-P-CCNC: 32 U/L (ref 0–45)
BILIRUB SERPL-MCNC: 0.5 MG/DL
BUN SERPL-MCNC: 12.6 MG/DL (ref 8–23)
CALCIUM SERPL-MCNC: 9.7 MG/DL (ref 8.8–10.4)
CHLORIDE SERPL-SCNC: 103 MMOL/L (ref 98–107)
CHOLEST SERPL-MCNC: 190 MG/DL
CREAT SERPL-MCNC: 0.68 MG/DL (ref 0.51–0.95)
EGFRCR SERPLBLD CKD-EPI 2021: >90 ML/MIN/1.73M2
ERYTHROCYTE [DISTWIDTH] IN BLOOD BY AUTOMATED COUNT: 12.4 % (ref 10–15)
FASTING STATUS PATIENT QL REPORTED: YES
FASTING STATUS PATIENT QL REPORTED: YES
GLUCOSE SERPL-MCNC: 94 MG/DL (ref 70–99)
HCO3 SERPL-SCNC: 28 MMOL/L (ref 22–29)
HCT VFR BLD AUTO: 40.4 % (ref 35–47)
HDLC SERPL-MCNC: 76 MG/DL
HGB BLD-MCNC: 13.8 G/DL (ref 11.7–15.7)
HOLD SPECIMEN: NORMAL
HOLD SPECIMEN: NORMAL
LDLC SERPL CALC-MCNC: 103 MG/DL
MCH RBC QN AUTO: 30.1 PG (ref 26.5–33)
MCHC RBC AUTO-ENTMCNC: 34.2 G/DL (ref 31.5–36.5)
MCV RBC AUTO: 88 FL (ref 78–100)
NONHDLC SERPL-MCNC: 114 MG/DL
PLATELET # BLD AUTO: 227 10E3/UL (ref 150–450)
POTASSIUM SERPL-SCNC: 4.2 MMOL/L (ref 3.4–5.3)
PROT SERPL-MCNC: 7.4 G/DL (ref 6.4–8.3)
RBC # BLD AUTO: 4.58 10E6/UL (ref 3.8–5.2)
SODIUM SERPL-SCNC: 141 MMOL/L (ref 135–145)
TRIGL SERPL-MCNC: 56 MG/DL
WBC # BLD AUTO: 5.3 10E3/UL (ref 4–11)

## 2024-09-10 PROCEDURE — 36415 COLL VENOUS BLD VENIPUNCTURE: CPT | Performed by: FAMILY MEDICINE

## 2024-09-10 PROCEDURE — 85027 COMPLETE CBC AUTOMATED: CPT | Performed by: FAMILY MEDICINE

## 2024-09-10 PROCEDURE — G0439 PPPS, SUBSEQ VISIT: HCPCS | Performed by: FAMILY MEDICINE

## 2024-09-10 PROCEDURE — 80061 LIPID PANEL: CPT | Performed by: FAMILY MEDICINE

## 2024-09-10 PROCEDURE — 80053 COMPREHEN METABOLIC PANEL: CPT | Performed by: FAMILY MEDICINE

## 2024-09-10 ASSESSMENT — ANXIETY QUESTIONNAIRES
8. IF YOU CHECKED OFF ANY PROBLEMS, HOW DIFFICULT HAVE THESE MADE IT FOR YOU TO DO YOUR WORK, TAKE CARE OF THINGS AT HOME, OR GET ALONG WITH OTHER PEOPLE?: NOT DIFFICULT AT ALL
GAD7 TOTAL SCORE: 7
7. FEELING AFRAID AS IF SOMETHING AWFUL MIGHT HAPPEN: SEVERAL DAYS

## 2024-09-10 ASSESSMENT — PATIENT HEALTH QUESTIONNAIRE - PHQ9
SUM OF ALL RESPONSES TO PHQ QUESTIONS 1-9: 2
10. IF YOU CHECKED OFF ANY PROBLEMS, HOW DIFFICULT HAVE THESE PROBLEMS MADE IT FOR YOU TO DO YOUR WORK, TAKE CARE OF THINGS AT HOME, OR GET ALONG WITH OTHER PEOPLE: NOT DIFFICULT AT ALL
SUM OF ALL RESPONSES TO PHQ QUESTIONS 1-9: 2

## 2024-09-10 ASSESSMENT — PAIN SCALES - GENERAL: PAINLEVEL: MILD PAIN (2)

## 2024-09-10 NOTE — PATIENT INSTRUCTIONS
Patient Education CBT- for anxiety  [place] [facility] . [place]  [phone]   Luminous mind       The 10-year ASCVD risk score (Brittanie ANDREA, et al., 2019) is: 7.5%    Values used to calculate the score:      Age: 69 years      Sex: Female      Is Non- : No      Diabetic: No      Tobacco smoker: No      Systolic Blood Pressure: 117 mmHg      Is BP treated: No      HDL Cholesterol: 70 mg/dL      Total Cholesterol: 184 mg/dL    Preventive Care Advice   This is general advice given by our system to help you stay healthy. However, your care team may have specific advice just for you. Please talk to your care team about your preventive care needs.  Nutrition  Eat 5 or more servings of fruits and vegetables each day.  Try wheat bread, brown rice and whole grain pasta (instead of white bread, rice, and pasta).  Get enough calcium and vitamin D. Check the label on foods and aim for 100% of the RDA (recommended daily allowance).  Lifestyle  Exercise at least 150 minutes each week  (30 minutes a day, 5 days a week).  Do muscle strengthening activities 2 days a week. These help control your weight and prevent disease.  No smoking.  Wear sunscreen to prevent skin cancer.  Have a dental exam and cleaning every 6 months.  Yearly exams  See your health care team every year to talk about:  Any changes in your health.  Any medicines your care team has prescribed.  Preventive care, family planning, and ways to prevent chronic diseases.  Shots (vaccines)   HPV shots (up to age 26), if you've never had them before.  Hepatitis B shots (up to age 59), if you've never had them before.  COVID-19 shot: Get this shot when it's due.  Flu shot: Get a flu shot every year.  Tetanus shot: Get a tetanus shot every 10 years.  Pneumococcal, hepatitis A, and RSV shots: Ask your care team if you need these based on your risk.  Shingles shot (for age 50 and up)  General health tests  Diabetes screening:  Starting at age 35,  Get screened for diabetes at least every 3 years.  If you are younger than age 35, ask your care team if you should be screened for diabetes.  Cholesterol test: At age 39, start having a cholesterol test every 5 years, or more often if advised.  Bone density scan (DEXA): At age 50, ask your care team if you should have this scan for osteoporosis (brittle bones).  Hepatitis C: Get tested at least once in your life.  STIs (sexually transmitted infections)  Before age 24: Ask your care team if you should be screened for STIs.  After age 24: Get screened for STIs if you're at risk. You are at risk for STIs (including HIV) if:  You are sexually active with more than one person.  You don't use condoms every time.  You or a partner was diagnosed with a sexually transmitted infection.  If you are at risk for HIV, ask about PrEP medicine to prevent HIV.  Get tested for HIV at least once in your life, whether you are at risk for HIV or not.  Cancer screening tests  Cervical cancer screening: If you have a cervix, begin getting regular cervical cancer screening tests starting at age 21.  Breast cancer scan (mammogram): If you've ever had breasts, begin having regular mammograms starting at age 40. This is a scan to check for breast cancer.  Colon cancer screening: It is important to start screening for colon cancer at age 45.  Have a colonoscopy test every 10 years (or more often if you're at risk) Or, ask your provider about stool tests like a FIT test every year or Cologuard test every 3 years.  To learn more about your testing options, visit:   .  For help making a decision, visit:   https://bit.ly/dg03854.  Prostate cancer screening test: If you have a prostate, ask your care team if a prostate cancer screening test (PSA) at age 55 is right for you.  Lung cancer screening: If you are a current or former smoker ages 50 to 80, ask your care team if ongoing lung cancer screenings are right for you.  For informational purposes  only. Not to replace the advice of your health care provider. Copyright   2023 [facility]. All rights reserved. Clinically reviewed by the [facility]. [employer] 520877 - REV 01/24.

## 2024-10-15 ENCOUNTER — HOSPITAL ENCOUNTER (OUTPATIENT)
Dept: CARDIOLOGY | Facility: CLINIC | Age: 69
Discharge: HOME | End: 2024-10-15
Attending: FAMILY MEDICINE | Admitting: FAMILY MEDICINE
Payer: COMMERCIAL

## 2024-10-15 DIAGNOSIS — E78.5 HYPERLIPIDEMIA LDL GOAL <160: ICD-10-CM

## 2024-10-15 PROCEDURE — 75571 CT HRT W/O DYE W/CA TEST: CPT

## 2024-10-15 PROCEDURE — 75571 CT HRT W/O DYE W/CA TEST: CPT | Mod: 26 | Performed by: INTERNAL MEDICINE

## 2024-10-16 ENCOUNTER — MYC MEDICAL ADVICE (OUTPATIENT)
Dept: FAMILY MEDICINE | Facility: CLINIC | Age: 69
End: 2024-10-16
Payer: COMMERCIAL

## 2024-10-16 DIAGNOSIS — R93.1 HIGH CORONARY ARTERY CALCIUM SCORE: ICD-10-CM

## 2024-10-16 DIAGNOSIS — E78.5 SERUM LIPIDS HIGH: ICD-10-CM

## 2024-10-16 DIAGNOSIS — E78.5 SERUM LIPIDS HIGH: Primary | ICD-10-CM

## 2024-10-16 RX ORDER — ROSUVASTATIN CALCIUM 10 MG/1
10 TABLET, COATED ORAL DAILY
Qty: 90 TABLET | Refills: 3 | Status: SHIPPED | OUTPATIENT
Start: 2024-10-16

## 2024-10-16 RX ORDER — ROSUVASTATIN CALCIUM 10 MG/1
10 TABLET, COATED ORAL DAILY
Qty: 90 TABLET | Refills: 3 | Status: SHIPPED | OUTPATIENT
Start: 2024-10-16 | End: 2024-10-16

## 2024-10-16 NOTE — PROGRESS NOTES
1. Serum lipids high  - rosuvastatin (CRESTOR) 10 MG tablet; Take 1 tablet (10 mg) by mouth daily.  Dispense: 90 tablet; Refill: 3    2. High coronary artery calcium score  - rosuvastatin (CRESTOR) 10 MG tablet; Take 1 tablet (10 mg) by mouth daily.  Dispense: 90 tablet; Refill: 3

## 2025-03-24 ENCOUNTER — TRANSFERRED RECORDS (OUTPATIENT)
Dept: HEALTH INFORMATION MANAGEMENT | Facility: CLINIC | Age: 70
End: 2025-03-24
Payer: COMMERCIAL

## 2025-03-24 ENCOUNTER — MYC MEDICAL ADVICE (OUTPATIENT)
Dept: FAMILY MEDICINE | Facility: CLINIC | Age: 70
End: 2025-03-24
Payer: COMMERCIAL

## 2025-04-08 ENCOUNTER — MYC MEDICAL ADVICE (OUTPATIENT)
Dept: FAMILY MEDICINE | Facility: CLINIC | Age: 70
End: 2025-04-08
Payer: COMMERCIAL

## 2025-04-08 NOTE — TELEPHONE ENCOUNTER
Dr. Marie,    Please see below [employer] message and advise.     Thanks,   Angela EUGENE RN

## 2025-04-29 ENCOUNTER — MYC MEDICAL ADVICE (OUTPATIENT)
Dept: FAMILY MEDICINE | Facility: CLINIC | Age: 70
End: 2025-04-29
Payer: COMMERCIAL

## 2025-04-29 DIAGNOSIS — E78.5 HYPERLIPIDEMIA LDL GOAL <100: Primary | ICD-10-CM

## 2025-04-29 NOTE — TELEPHONE ENCOUNTER
Dr. Marie,    Please see below [employer] message and advise.     Thanks,   Angela EUGENE RN

## 2025-04-30 RX ORDER — ROSUVASTATIN CALCIUM 5 MG/1
5 TABLET, COATED ORAL DAILY
Qty: 90 TABLET | Refills: 3 | Status: SHIPPED | OUTPATIENT
Start: 2025-04-30 | End: 2025-04-30

## 2025-04-30 RX ORDER — ROSUVASTATIN CALCIUM 5 MG/1
5 TABLET, COATED ORAL DAILY
Qty: 90 TABLET | Refills: 3 | Status: SHIPPED | OUTPATIENT
Start: 2025-04-30

## 2025-06-04 ENCOUNTER — NURSE TRIAGE (OUTPATIENT)
Dept: FAMILY MEDICINE | Facility: CLINIC | Age: 70
End: 2025-06-04
Payer: COMMERCIAL

## 2025-06-05 ENCOUNTER — OFFICE VISIT (OUTPATIENT)
Dept: FAMILY MEDICINE | Facility: CLINIC | Age: 70
End: 2025-06-05
Payer: COMMERCIAL

## 2025-06-05 VITALS
BODY MASS INDEX: 22.58 KG/M2 | DIASTOLIC BLOOD PRESSURE: 70 MMHG | RESPIRATION RATE: 16 BRPM | OXYGEN SATURATION: 96 % | TEMPERATURE: 98 F | SYSTOLIC BLOOD PRESSURE: 130 MMHG | HEART RATE: 62 BPM | HEIGHT: 66 IN | WEIGHT: 140.5 LBS

## 2025-06-05 DIAGNOSIS — N64.4 BREAST PAIN, LEFT: Primary | ICD-10-CM

## 2025-06-05 ASSESSMENT — PAIN SCALES - GENERAL: PAINLEVEL_OUTOF10: MODERATE PAIN (5)

## 2025-06-05 NOTE — PROGRESS NOTES
Assessment & Plan     Breast pain, left  The underlying cause of her symptoms is not clear.  I recommended we pursue a diagnostic breast ultrasound and mammogram for further evaluation.  - US Breast Left Limited 1-3 Quadrants; Future  - MA Diagnostic Left w/ Stephan; Future    The longitudinal plan of care for the diagnosis(es)/condition(s) as documented were addressed during this visit. Due to the added complexity in care, I will continue to support Marbella in the subsequent management and with ongoing continuity of care.          Subjective   Marbella is a 70 year old, presenting for the following health issues:  Breast Pain        6/5/2025     1:16 PM   Additional Questions   Roomed by Alexei BARNETT     History of Present Illness       Reason for visit:  Pain in left breast  Symptoms include:  Pain in left breast, left side and nipple  Symptom intensity:  Mild  Symptom progression:  Staying the same  Had these symptoms before:  No  What makes it worse:  No  What makes it better:  No   She is taking medications regularly.      Feels like a bruise  Dull pain   Left side of left breast extending down towards the nipple  Pain feels more muscular or nerve related    Marbella is a 70-year-old female who presents to the clinic to be evaluated for pain in the left lateral breast region that started a month ago.  She denies any specific injury that may have caused this.  She reports that the lateral side of her left breast feels sore to the touch, like a bruise.  However, the overlying skin looks normal.  She denies having a rash.  She does not feel any unusual masses.  She had a mammogram on 3/24/2025 which showed scattered fibroglandular densities as usual for her, but no signs of cancer.              Review of Systems  Constitutional, HEENT, cardiovascular, pulmonary, GI, , musculoskeletal, neuro, skin, endocrine and psych systems are negative, except as otherwise noted.      Objective    /70   Pulse 62   Temp 98  " F (36.7  C) (Temporal)   Resp 16   Ht 1.687 m (5' 6.4\")   Wt 63.7 kg (140 lb 8 oz)   LMP  (LMP Unknown)   SpO2 96%   BMI 22.40 kg/m    Body mass index is 22.4 kg/m .  Physical Exam   GENERAL: alert and no distress  NECK: no adenopathy, no asymmetry, masses, or scars  RESP: lungs clear to auscultation - no rales, rhonchi or wheezes  BREAST: Mildly tender to palpation over the left lateral breast region.  No masses or nipple discharge and no palpable axillary masses or adenopathy.  CV: regular rate and rhythm, normal S1 S2, no S3 or S4, no murmur, click or rub, no peripheral edema  ABDOMEN: soft, nontender, no hepatosplenomegaly, no masses and bowel sounds normal  MS: no gross musculoskeletal defects noted, no edema.  Nontender over the cervical and thoracic spine and back musculature  SKIN: no suspicious lesions or rashes  NEURO: Normal strength and tone, mentation intact and speech normal  PSYCH: mentation appears normal, affect normal/bright            Signed Electronically by: Nikko Coon,     "

## 2025-06-05 NOTE — TELEPHONE ENCOUNTER
"Spoke to patient  Pain has been present for ~2 weeks, has not changed  Pain is on the side of the breast near the nipple  Denies all other symptoms  Scheduled for OV today for best next steps  Patient verbalized understanding and agrees with plan of care  Emy Barrios RN    Reason for Disposition   Breast pain and cause is not known    Additional Information   Negative: Chest pain   Negative: Breastfeeding questions about baby   Negative: Breastfeeding questions about mother (breast symptoms or feeling sick)   Negative: Breastfeeding questions about mother's medicines and diet   Negative: Postpartum breast pain and swelling, not going to continue breastfeeding / pumping   Negative: Small spot, skin growth or mole   Negative: SEVERE breast pain and fever > 103 F  (39.4 C)   Negative: Patient sounds very sick or weak to the triager   Negative: Breast looks infected (spreading redness, feels hot or painful to touch) and fever   Negative: Breast looks infected (spreading redness, feels hot or painful to touch) and no fever   Negative: Painful rash and multiple small blisters grouped together (i.e., dermatomal distribution or \"band\" or \"stripe\")   Negative: Cuts, burns, or bruises of breasts and suspicious history for the injury   Negative: Breast lump   Negative: Nipple discharge and bloody   Negative: Nipple is inverted (i.e., points inward)  (Exception: Long-term physical characteristic, present for many years.)   Negative: Dry flaking-peeling skin of nipple   Negative: Change in shape or appearance of breast   Negative: Dimpling of skin of breast (e.g., skin is being pulled inward, or skin looks like the outside of an orange peel)   Negative: Patient wants to be seen   Negative: Breast pain or tenderness that occurs monthly before menstrual period, and has NOT been evaluated by a doctor (or NP/PA)   Negative: Breast tenderness and fullness and pregnant   Negative: Nipple discharge and not bloody (e.g., clear, " white, yellow, brown, green)    Protocols used: Breast Symptoms-A-OH

## 2025-06-09 ENCOUNTER — TRANSFERRED RECORDS (OUTPATIENT)
Dept: HEALTH INFORMATION MANAGEMENT | Facility: CLINIC | Age: 70
End: 2025-06-09

## (undated) RX ORDER — FENTANYL CITRATE 50 UG/ML
INJECTION, SOLUTION INTRAMUSCULAR; INTRAVENOUS
Status: DISPENSED
Start: 2021-08-02